# Patient Record
Sex: MALE | Race: BLACK OR AFRICAN AMERICAN | Employment: UNEMPLOYED | URBAN - METROPOLITAN AREA
[De-identification: names, ages, dates, MRNs, and addresses within clinical notes are randomized per-mention and may not be internally consistent; named-entity substitution may affect disease eponyms.]

---

## 2018-01-06 ENCOUNTER — HOSPITAL ENCOUNTER (EMERGENCY)
Age: 53
Discharge: HOME | End: 2018-01-06
Attending: EMERGENCY MEDICINE
Payer: SUBSIDIZED

## 2018-01-06 ENCOUNTER — APPOINTMENT (OUTPATIENT)
Dept: GENERAL RADIOLOGY | Age: 53
End: 2018-01-06
Attending: PHYSICIAN ASSISTANT
Payer: SUBSIDIZED

## 2018-01-06 VITALS
DIASTOLIC BLOOD PRESSURE: 85 MMHG | HEART RATE: 76 BPM | RESPIRATION RATE: 20 BRPM | OXYGEN SATURATION: 98 % | SYSTOLIC BLOOD PRESSURE: 122 MMHG | TEMPERATURE: 98.4 F | WEIGHT: 160 LBS

## 2018-01-06 DIAGNOSIS — E86.0 DEHYDRATION: ICD-10-CM

## 2018-01-06 DIAGNOSIS — S62.102A CLOSED FRACTURE OF LEFT WRIST, INITIAL ENCOUNTER: ICD-10-CM

## 2018-01-06 DIAGNOSIS — R73.9 HYPERGLYCEMIA: Primary | ICD-10-CM

## 2018-01-06 LAB
ANION GAP SERPL CALC-SCNC: 6 MMOL/L (ref 3–18)
APPEARANCE UR: CLEAR
BASOPHILS # BLD: 0.1 K/UL (ref 0–0.1)
BASOPHILS NFR BLD: 1 % (ref 0–2)
BILIRUB UR QL: NEGATIVE
BUN SERPL-MCNC: 21 MG/DL (ref 7–18)
BUN/CREAT SERPL: 23 (ref 12–20)
CALCIUM SERPL-MCNC: 8.7 MG/DL (ref 8.5–10.1)
CHLORIDE SERPL-SCNC: 98 MMOL/L (ref 100–108)
CO2 SERPL-SCNC: 28 MMOL/L (ref 21–32)
COLOR UR: YELLOW
CREAT SERPL-MCNC: 0.92 MG/DL (ref 0.6–1.3)
DIFFERENTIAL METHOD BLD: ABNORMAL
EOSINOPHIL # BLD: 0.4 K/UL (ref 0–0.4)
EOSINOPHIL NFR BLD: 8 % (ref 0–5)
ERYTHROCYTE [DISTWIDTH] IN BLOOD BY AUTOMATED COUNT: 12.9 % (ref 11.6–14.5)
GLUCOSE BLD STRIP.AUTO-MCNC: 100 MG/DL (ref 70–110)
GLUCOSE BLD STRIP.AUTO-MCNC: 504 MG/DL (ref 70–110)
GLUCOSE SERPL-MCNC: 453 MG/DL (ref 74–99)
GLUCOSE UR STRIP.AUTO-MCNC: >1000 MG/DL
HCT VFR BLD AUTO: 42.3 % (ref 36–48)
HGB BLD-MCNC: 14.2 G/DL (ref 13–16)
HGB UR QL STRIP: NEGATIVE
KETONES UR QL STRIP.AUTO: NEGATIVE MG/DL
LEUKOCYTE ESTERASE UR QL STRIP.AUTO: NEGATIVE
LYMPHOCYTES # BLD: 2.1 K/UL (ref 0.9–3.6)
LYMPHOCYTES NFR BLD: 39 % (ref 21–52)
MCH RBC QN AUTO: 28.4 PG (ref 24–34)
MCHC RBC AUTO-ENTMCNC: 33.6 G/DL (ref 31–37)
MCV RBC AUTO: 84.6 FL (ref 74–97)
MONOCYTES # BLD: 0.5 K/UL (ref 0.05–1.2)
MONOCYTES NFR BLD: 10 % (ref 3–10)
NEUTS SEG # BLD: 2.2 K/UL (ref 1.8–8)
NEUTS SEG NFR BLD: 42 % (ref 40–73)
NITRITE UR QL STRIP.AUTO: NEGATIVE
PH UR STRIP: 5.5 [PH] (ref 5–8)
PLATELET # BLD AUTO: 355 K/UL (ref 135–420)
PMV BLD AUTO: 10.1 FL (ref 9.2–11.8)
POTASSIUM SERPL-SCNC: 4.4 MMOL/L (ref 3.5–5.5)
PROT UR STRIP-MCNC: NEGATIVE MG/DL
RBC # BLD AUTO: 5 M/UL (ref 4.7–5.5)
SODIUM SERPL-SCNC: 132 MMOL/L (ref 136–145)
SP GR UR REFRACTOMETRY: >1.03 (ref 1–1.03)
UROBILINOGEN UR QL STRIP.AUTO: 0.2 EU/DL (ref 0.2–1)
WBC # BLD AUTO: 5.2 K/UL (ref 4.6–13.2)

## 2018-01-06 PROCEDURE — 74011250636 HC RX REV CODE- 250/636: Performed by: PHYSICIAN ASSISTANT

## 2018-01-06 PROCEDURE — 96375 TX/PRO/DX INJ NEW DRUG ADDON: CPT

## 2018-01-06 PROCEDURE — 74011250637 HC RX REV CODE- 250/637: Performed by: EMERGENCY MEDICINE

## 2018-01-06 PROCEDURE — 85025 COMPLETE CBC W/AUTO DIFF WBC: CPT

## 2018-01-06 PROCEDURE — 81003 URINALYSIS AUTO W/O SCOPE: CPT

## 2018-01-06 PROCEDURE — 80048 BASIC METABOLIC PNL TOTAL CA: CPT

## 2018-01-06 PROCEDURE — 73110 X-RAY EXAM OF WRIST: CPT

## 2018-01-06 PROCEDURE — 74011636637 HC RX REV CODE- 636/637: Performed by: PHYSICIAN ASSISTANT

## 2018-01-06 PROCEDURE — 96374 THER/PROPH/DIAG INJ IV PUSH: CPT

## 2018-01-06 PROCEDURE — 96361 HYDRATE IV INFUSION ADD-ON: CPT

## 2018-01-06 PROCEDURE — 75810000053 HC SPLINT APPLICATION

## 2018-01-06 PROCEDURE — 99284 EMERGENCY DEPT VISIT MOD MDM: CPT

## 2018-01-06 PROCEDURE — 82962 GLUCOSE BLOOD TEST: CPT

## 2018-01-06 PROCEDURE — 74011250637 HC RX REV CODE- 250/637: Performed by: PHYSICIAN ASSISTANT

## 2018-01-06 RX ORDER — DIPHENHYDRAMINE HCL 25 MG
25 CAPSULE ORAL
Status: COMPLETED | OUTPATIENT
Start: 2018-01-06 | End: 2018-01-06

## 2018-01-06 RX ORDER — PSEUDOEPHEDRINE HCL 30 MG
60 TABLET ORAL
Status: DISCONTINUED | OUTPATIENT
Start: 2018-01-06 | End: 2018-01-06

## 2018-01-06 RX ORDER — INSULIN GLARGINE 100 [IU]/ML
40 INJECTION, SOLUTION SUBCUTANEOUS DAILY
Qty: 8 PEN | Refills: 0 | Status: SHIPPED | OUTPATIENT
Start: 2018-01-06 | End: 2018-01-16

## 2018-01-06 RX ORDER — ONDANSETRON 2 MG/ML
4 INJECTION INTRAMUSCULAR; INTRAVENOUS
Status: COMPLETED | OUTPATIENT
Start: 2018-01-06 | End: 2018-01-06

## 2018-01-06 RX ORDER — KETOROLAC TROMETHAMINE 30 MG/ML
30 INJECTION, SOLUTION INTRAMUSCULAR; INTRAVENOUS
Status: COMPLETED | OUTPATIENT
Start: 2018-01-06 | End: 2018-01-06

## 2018-01-06 RX ORDER — LIDOCAINE 50 MG/G
1 PATCH TOPICAL EVERY 24 HOURS
Status: DISCONTINUED | OUTPATIENT
Start: 2018-01-06 | End: 2018-01-06 | Stop reason: HOSPADM

## 2018-01-06 RX ADMIN — DIPHENHYDRAMINE HYDROCHLORIDE 25 MG: 25 CAPSULE ORAL at 18:02

## 2018-01-06 RX ADMIN — KETOROLAC TROMETHAMINE 30 MG: 30 INJECTION, SOLUTION INTRAMUSCULAR at 16:19

## 2018-01-06 RX ADMIN — SODIUM CHLORIDE 1000 ML: 900 INJECTION, SOLUTION INTRAVENOUS at 16:14

## 2018-01-06 RX ADMIN — ONDANSETRON 4 MG: 2 INJECTION INTRAMUSCULAR; INTRAVENOUS at 16:13

## 2018-01-06 RX ADMIN — INSULIN HUMAN 10 UNITS: 100 INJECTION, SOLUTION PARENTERAL at 16:13

## 2018-01-06 NOTE — ED TRIAGE NOTES
Patient complains of pain in his left wrist after falling 2 days ago on to the ice [place].  Patient also complains that his sugar is high, he states that he took his last bit of insulin Lantus today

## 2018-01-06 NOTE — ED PROVIDER NOTES
HPI Comments: Jeanna Riley is a 46 y.o. Male with a history of HTN and DM who presents today for medication refill and left wrist pain. He states he takes 40 units of lantus twice a day but ran out last night. Does not check BG regularly and states BG is normally between 200-300. He reports his wrist pain is a 5/10, he fell on the ice, mechanical fall, did not hit his head and denies LOC. He has tried nothing for this pain, nothing makes it better and movement makes it worse. Pt denies any fevers or chills, headache, dizziness or light headedness, ENT issues, CP or discomfort, SOB, cough, n/v/d/c, abd pain, back pain, diaphoresis, melena/hematochezia, dysuria, hematuria, frequency, focal weakness/numbness/tingling, or rash. Patient has no other complaints at this time. Patient is a 46 y.o. male presenting with hyperglycemia. The history is provided by the patient. High Blood Sugar   Pertinent negatives include no chest pain, no abdominal pain, no headaches and no shortness of breath. Past Medical History:   Diagnosis Date    Diabetes ([facility] [address]     Hypertension        History reviewed. No pertinent surgical history. History reviewed. No pertinent family history. Social History     Social History    Marital status: SINGLE     Spouse name: N/A    Number of children: N/A    Years of education: N/A     Occupational History    Not on file. Social History Main Topics    Smoking status: Former Smoker    Smokeless tobacco: Not on file    Alcohol use Yes    Drug use: Not on file    Sexual activity: Not on file     Other Topics Concern    Not on file     Social History Narrative    No narrative on file         ALLERGIES: Review of patient's allergies indicates no known allergies. Review of Systems   Constitutional: Negative for chills and fever. HENT: Negative for congestion, rhinorrhea and sore throat. Eyes: Negative. Respiratory: Negative for cough and shortness of breath. Cardiovascular: Negative for chest pain. Gastrointestinal: Positive for nausea. Negative for abdominal pain, blood in stool, constipation, diarrhea and vomiting. Endocrine: Positive for polydipsia. Genitourinary: Negative for dysuria, frequency and hematuria. Musculoskeletal: Negative for back pain and myalgias. Left wrist pain    Skin: Negative for rash and wound. Allergic/Immunologic: Negative. Neurological: Negative for dizziness and headaches. Psychiatric/Behavioral: Negative. Vitals:    01/06/18 1515   BP: 122/85   Pulse: 76   Resp: 20   Temp: 98.4 °F (36.9 °C)   SpO2: 98%   Weight: 72.6 kg (160 lb)            Physical Exam   Constitutional: He is oriented to person, place, and time. He appears well-developed and well-nourished. No distress. HENT:   Head: Normocephalic and atraumatic. Eyes: Conjunctivae are normal.   Neck: Normal range of motion. Neck supple. Cardiovascular: Normal rate, regular rhythm and normal heart sounds. Pulmonary/Chest: Effort normal and breath sounds normal. No respiratory distress. He exhibits no tenderness. Abdominal: Soft. Bowel sounds are normal. He exhibits no distension. There is no tenderness. There is no rebound and no guarding. Musculoskeletal: Normal range of motion. He exhibits no edema or deformity. Left wrist: He exhibits tenderness. He exhibits normal range of motion, no bony tenderness, no swelling, no crepitus, no deformity and no laceration. Left wrist pain, no identifiable specific  point, tenderness to palpation over entire wrist both volar and dorsal surface. He is neurovascularly intact and cap refill is <3 sec. Full ROM No obvious deformity or dislocation    Neurological: He is alert and oriented to person, place, and time. Skin: Skin is warm and dry. He is not diaphoretic. Psychiatric: He has a normal mood and affect. His behavior is normal.   Nursing note and vitals reviewed.        MDM  Number of Diagnoses or Management Options  Closed fracture of left wrist, initial encounter:   Dehydration:   Hyperglycemia:   Diagnosis management comments: Differential: Fracture, laceration, contusion, scaphoid fracture, DKA, hyperglycemia     Plan: Will order a fluid bolus, insulin bolus, and left wrist xray. 5:41 PM  There is no evidence of DKA or urinary tract infection. Patient BG is down at 100, will give patient a snack before leaving and emergency snack to go home with. Patient states understanding and awareness of what low blood sugar feels like and understanding of how to correct that. I will discharge home with refill on lantus to get him to his Wednesday appointment with PCP. Patient is feeling much better after fluids. Waiting for wet read on xray. 6:01 PM : Pt care transferred to [facility] ,ED provider. History of patient complaint(s), available diagnostic reports and current treatment plan has been discussed thoroughly. Bedside rounding on patient occured : No  Intended disposition of patient : Home  Pending diagnostics reports and/or labs (please list): Pending xray of left wrist results     6:35 PM  X-ray shows nothing acute. However, pt only has snuff box tenderness; apply splint; [facility] where pt prefers to move. Pt informed his actual fracture may not be visible on x-ray for a week and needs f/up. Splint applied by nurse to left wrist; excellent position. N/v intact before and after application.            Amount and/or Complexity of Data Reviewed  Clinical lab tests: ordered and reviewed  Tests in the radiology section of CPT®: ordered and reviewed    Risk of Complications, Morbidity, and/or Mortality  Presenting problems: low  Diagnostic procedures: low  Management options: low      ED Course       Procedures    Recent Results (from the past 12 hour(s))   GLUCOSE, POC    Collection Time: 01/06/18  3:14 PM   Result Value Ref Range    Glucose (POC) 504 (HH) 70 - 110 mg/dL CBC WITH AUTOMATED DIFF    Collection Time: 01/06/18  4:05 PM   Result Value Ref Range    WBC 5.2 4.6 - 13.2 K/uL    RBC 5.00 4.70 - 5.50 M/uL    HGB 14.2 13.0 - 16.0 g/dL    HCT 42.3 36.0 - 48.0 %    MCV 84.6 74.0 - 97.0 FL    MCH 28.4 24.0 - 34.0 PG    MCHC 33.6 31.0 - 37.0 g/dL    RDW 12.9 11.6 - 14.5 %    PLATELET 975 563 - 430 K/uL    MPV 10.1 9.2 - 11.8 FL    NEUTROPHILS 42 40 - 73 %    LYMPHOCYTES 39 21 - 52 %    MONOCYTES 10 3 - 10 %    EOSINOPHILS 8 (H) 0 - 5 %    BASOPHILS 1 0 - 2 %    ABS. NEUTROPHILS 2.2 1.8 - 8.0 K/UL    ABS. LYMPHOCYTES 2.1 0.9 - 3.6 K/UL    ABS. MONOCYTES 0.5 0.05 - 1.2 K/UL    ABS. EOSINOPHILS 0.4 0.0 - 0.4 K/UL    ABS. BASOPHILS 0.1 0.0 - 0.1 K/UL    DF AUTOMATED     METABOLIC PANEL, BASIC    Collection Time: 01/06/18  4:05 PM   Result Value Ref Range    Sodium 132 (L) 136 - 145 mmol/L    Potassium 4.4 3.5 - 5.5 mmol/L    Chloride 98 (L) 100 - 108 mmol/L    CO2 28 21 - 32 mmol/L    Anion gap 6 3.0 - 18 mmol/L    Glucose 453 (HH) 74 - 99 mg/dL    BUN 21 (H) 7.0 - 18 MG/DL    Creatinine 0.92 0.6 - 1.3 MG/DL    BUN/Creatinine ratio 23 (H) 12 - 20      GFR est AA >60 >60 ml/min/1.73m2    GFR est non-AA >60 >60 ml/min/1.73m2    Calcium 8.7 8.5 - 10.1 MG/DL   URINALYSIS W/ RFLX MICROSCOPIC    Collection Time: 01/06/18  4:05 PM   Result Value Ref Range    Color YELLOW      Appearance CLEAR      Specific gravity >1.030 (H) 1.005 - 1.030    pH (UA) 5.5 5.0 - 8.0      Protein NEGATIVE  NEG mg/dL    Glucose >1000 (A) NEG mg/dL    Ketone NEGATIVE  NEG mg/dL    Bilirubin NEGATIVE  NEG      Blood NEGATIVE  NEG      Urobilinogen 0.2 0.2 - 1.0 EU/dL    Nitrites NEGATIVE  NEG      Leukocyte Esterase NEGATIVE  NEG     GLUCOSE, POC    Collection Time: 01/06/18  5:31 PM   Result Value Ref Range    Glucose (POC) 100 70 - 110 mg/dL     6:36 PM  Diagnosis:   1. Hyperglycemia    2. Dehydration    3.  Closed fracture of left wrist, initial encounter          Disposition: home    Follow-up Information Follow up With Details Comments [address]  [place] [address]  [address] Schedule an appointment as soon as possible for a visit in 3 days  [address] [place] [facility] EMERGENCY DEPT  If symptoms worsen return immediately [address] [facility]    Asif Dudley MD Schedule an appointment as soon as possible for a visit in 3 days  [address] [address]  [phone]            Patient's Medications   Start Taking    INSULIN GLARGINE (LANTUS,BASAGLAR) 100 UNIT/ML (3 ML) INPN    40 Units by SubCUTAneous route daily for 10 days. INSULIN SYRINGE-NEEDLE U-100    40 Units by SubCUTAneous route two (2) times a day.  In the AM and PM   Continue Taking    No medications on file   These Medications have changed    No medications on file   Stop Taking    No medications on file

## 2018-01-07 NOTE — DISCHARGE INSTRUCTIONS
Learning About High Blood Sugar  What is high blood sugar? Your body turns the food you eat into glucose (sugar), which it uses for energy. But if your body isn't able to use the sugar right away, it can build up in your blood and lead to high blood sugar. When the amount of sugar in your blood stays too high for too much of the time, you may have diabetes. Diabetes is a disease that can cause serious health problems. The good news is that lifestyle changes may help you get your blood sugar back to normal and avoid or delay diabetes. What causes high blood sugar? Sugar (glucose) can build up in your blood if you:  · Are overweight. · Have a family history of diabetes. · Take certain medicines, such as steroids. What are the symptoms? Having high blood sugar may not cause any symptoms at all. Or it may make you feel very thirsty or very hungry. You may also urinate more often than usual, have blurry vision, or lose weight without trying. How is high blood sugar treated? You can take steps to lower your blood sugar level if you understand what makes it get higher. Your doctor may want you to learn how to test your blood sugar level at home. Then you can see how illness, stress, or different kinds of food or medicine raise or lower your blood sugar level. Other tests may be needed to see if you have diabetes. How can you prevent high blood sugar? · Watch your weight. If you're overweight, losing just a small amount of weight may help. Reducing fat around your waist is most important. · Limit the amount of calories, sweets, and unhealthy fat you eat. Ask your doctor if a dietitian can help you. A registered dietitian can help you create meal plans that fit your lifestyle. · Get at least 30 minutes of exercise on most days of the week. Exercise helps control your blood sugar. It also helps you maintain a healthy weight. Walking is a good choice.  You also may want to do other activities, such as running, swimming, cycling, or playing tennis or team sports. · If your doctor prescribed medicines, take them exactly as prescribed. Call your doctor if you think you are having a problem with your medicine. You will get more details on the specific medicines your doctor prescribes. Follow-up care is a key part of your treatment and safety. Be sure to make and go to all appointments, and call your doctor if you are having problems. It's also a good idea to know your test results and keep a list of the medicines you take. Where can you learn more? Go to http://paco-chavo.info/. Enter O108 in the search box to learn more about \"Learning About High Blood Sugar. \"  Current as of: March 13, 2017  Content Version: 11.4  © 7349-6739 [employer]. Care instructions adapted under license by [employer] (which disclaims liability or warranty for this information). If you have questions about a medical condition or this instruction, always ask your healthcare professional. [address] any warranty or liability for your use of this information. Learning About High Blood Sugar  What is high blood sugar? Your body turns the food you eat into glucose (sugar), which it uses for energy. But if your body isn't able to use the sugar right away, it can build up in your blood and lead to high blood sugar. When the amount of sugar in your blood stays too high for too much of the time, you may have diabetes. Diabetes is a disease that can cause serious health problems. The good news is that lifestyle changes may help you get your blood sugar back to normal and avoid or delay diabetes. What causes high blood sugar? Sugar (glucose) can build up in your blood if you:  · Are overweight. · Have a family history of diabetes. · Take certain medicines, such as steroids. What are the symptoms? Having high blood sugar may not cause any symptoms at all.  Or it may make you feel very thirsty or very hungry. You may also urinate more often than usual, have blurry vision, or lose weight without trying. How is high blood sugar treated? You can take steps to lower your blood sugar level if you understand what makes it get higher. Your doctor may want you to learn how to test your blood sugar level at home. Then you can see how illness, stress, or different kinds of food or medicine raise or lower your blood sugar level. Other tests may be needed to see if you have diabetes. How can you prevent high blood sugar? · Watch your weight. If you're overweight, losing just a small amount of weight may help. Reducing fat around your waist is most important. · Limit the amount of calories, sweets, and unhealthy fat you eat. Ask your doctor if a dietitian can help you. A registered dietitian can help you create meal plans that fit your lifestyle. · Get at least 30 minutes of exercise on most days of the week. Exercise helps control your blood sugar. It also helps you maintain a healthy weight. Walking is a good choice. You also may want to do other activities, such as running, swimming, cycling, or playing tennis or team sports. · If your doctor prescribed medicines, take them exactly as prescribed. Call your doctor if you think you are having a problem with your medicine. You will get more details on the specific medicines your doctor prescribes. Follow-up care is a key part of your treatment and safety. Be sure to make and go to all appointments, and call your doctor if you are having problems. It's also a good idea to know your test results and keep a list of the medicines you take. Where can you learn more? Go to http://paco-chavo.info/. Enter O108 in the search box to learn more about \"Learning About High Blood Sugar. \"  Current as of: March 13, 2017  Content Version: 11.4  © 0787-1426 [employer].  Care instructions adapted under license by [employer] (which disclaims liability or warranty for this information). If you have questions about a medical condition or this instruction, always ask your healthcare professional. [address] any warranty or liability for your use of this information.

## 2018-04-18 ENCOUNTER — HOSPITAL ENCOUNTER (EMERGENCY)
Age: 53
Discharge: HOME | End: 2018-04-18
Attending: EMERGENCY MEDICINE
Payer: SUBSIDIZED

## 2018-04-18 VITALS
OXYGEN SATURATION: 96 % | SYSTOLIC BLOOD PRESSURE: 159 MMHG | RESPIRATION RATE: 16 BRPM | HEART RATE: 73 BPM | DIASTOLIC BLOOD PRESSURE: 90 MMHG | TEMPERATURE: 97.1 F | WEIGHT: 79.3 LBS | BODY MASS INDEX: 12.45 KG/M2 | HEIGHT: 67 IN

## 2018-04-18 VITALS
OXYGEN SATURATION: 97 % | BODY MASS INDEX: 27.47 KG/M2 | HEIGHT: 67 IN | SYSTOLIC BLOOD PRESSURE: 152 MMHG | RESPIRATION RATE: 16 BRPM | TEMPERATURE: 97.1 F | HEART RATE: 70 BPM | DIASTOLIC BLOOD PRESSURE: 101 MMHG | WEIGHT: 175 LBS

## 2018-04-18 DIAGNOSIS — M25.562 CHRONIC PAIN OF BOTH KNEES: ICD-10-CM

## 2018-04-18 DIAGNOSIS — M25.532 CHRONIC PAIN OF LEFT WRIST: ICD-10-CM

## 2018-04-18 DIAGNOSIS — G89.29 CHRONIC PAIN OF BOTH KNEES: ICD-10-CM

## 2018-04-18 DIAGNOSIS — R73.9 HYPERGLYCEMIA: ICD-10-CM

## 2018-04-18 DIAGNOSIS — R21 RASH: Primary | ICD-10-CM

## 2018-04-18 DIAGNOSIS — M25.561 CHRONIC PAIN OF BOTH KNEES: ICD-10-CM

## 2018-04-18 DIAGNOSIS — Z76.0 MEDICATION REFILL: Primary | ICD-10-CM

## 2018-04-18 DIAGNOSIS — G89.29 CHRONIC PAIN OF LEFT WRIST: ICD-10-CM

## 2018-04-18 DIAGNOSIS — I10 ESSENTIAL HYPERTENSION: ICD-10-CM

## 2018-04-18 LAB — GLUCOSE BLD STRIP.AUTO-MCNC: 211 MG/DL (ref 70–110)

## 2018-04-18 PROCEDURE — 74011250637 HC RX REV CODE- 250/637: Performed by: PHYSICIAN ASSISTANT

## 2018-04-18 PROCEDURE — 99283 EMERGENCY DEPT VISIT LOW MDM: CPT

## 2018-04-18 PROCEDURE — 82962 GLUCOSE BLOOD TEST: CPT

## 2018-04-18 PROCEDURE — L3908 WHO COCK-UP NONMOLDE PRE OTS: HCPCS

## 2018-04-18 RX ORDER — LISINOPRIL 10 MG/1
10 TABLET ORAL
Status: COMPLETED | OUTPATIENT
Start: 2018-04-18 | End: 2018-04-18

## 2018-04-18 RX ORDER — NAPROXEN 250 MG/1
500 TABLET ORAL
Status: COMPLETED | OUTPATIENT
Start: 2018-04-18 | End: 2018-04-18

## 2018-04-18 RX ORDER — HYDROCORTISONE 0.5 %
OINTMENT (GRAM) TOPICAL 2 TIMES DAILY
Qty: 15 G | Refills: 0 | Status: SHIPPED | OUTPATIENT
Start: 2018-04-18 | End: 2018-04-28

## 2018-04-18 RX ORDER — LISINOPRIL 10 MG/1
10 TABLET ORAL DAILY
Qty: 30 TAB | Refills: 0 | Status: SHIPPED | OUTPATIENT
Start: 2018-04-18 | End: 2018-05-01 | Stop reason: DRUGHIGH

## 2018-04-18 RX ORDER — METFORMIN HYDROCHLORIDE 500 MG/1
500 TABLET ORAL
Status: COMPLETED | OUTPATIENT
Start: 2018-04-18 | End: 2018-04-18

## 2018-04-18 RX ORDER — DIPHENHYDRAMINE HCL 25 MG
25 CAPSULE ORAL
Status: COMPLETED | OUTPATIENT
Start: 2018-04-18 | End: 2018-04-18

## 2018-04-18 RX ORDER — METFORMIN HYDROCHLORIDE 500 MG/1
500 TABLET ORAL 2 TIMES DAILY WITH MEALS
Qty: 30 TAB | Refills: 0 | Status: SHIPPED | OUTPATIENT
Start: 2018-04-18 | End: 2018-05-01

## 2018-04-18 RX ORDER — DIPHENHYDRAMINE HCL 25 MG
CAPSULE ORAL
Qty: 16 CAP | Refills: 0 | Status: SHIPPED | OUTPATIENT
Start: 2018-04-18 | End: 2018-05-02

## 2018-04-18 RX ADMIN — DIPHENHYDRAMINE HYDROCHLORIDE 25 MG: 25 CAPSULE ORAL at 13:32

## 2018-04-18 RX ADMIN — METFORMIN HYDROCHLORIDE 500 MG: 500 TABLET, FILM COATED ORAL at 09:20

## 2018-04-18 RX ADMIN — Medication 500 MG: at 09:07

## 2018-04-18 RX ADMIN — LISINOPRIL 10 MG: 10 TABLET ORAL at 09:07

## 2018-04-18 NOTE — ED NOTES
The L.C. met with the client in the ER Department room 18 to discuss their follow up options. The client received the application for the [facility]. The client will receive assistance for their medications through the [employer]. The client was unable to afford their medications in the pass, has no insurance and resides at the [facility]. The plan of action is to work with the [employer] Department in which they will connect with a primary care provider at the [facility] where they will also receive assistance with their future medications.

## 2018-04-18 NOTE — DISCHARGE INSTRUCTIONS
Rash: Care Instructions  Your Care Instructions  A rash is any irritation or inflammation of the skin. Rashes have many possible causes, including allergy, infection, illness, heat, and emotional stress. Follow-up care is a key part of your treatment and safety. Be sure to make and go to all appointments, and call your doctor if you are having problems. It's also a good idea to know your test results and keep a list of the medicines you take. How can you care for yourself at home? · Wash the area with water only. Soap can make dryness and itching worse. Pat dry. · Put cold, wet cloths on the rash to reduce itching. · Keep cool, and stay out of the sun. · Leave the rash open to the air as much of the time as possible. · Sometimes petroleum jelly (Vaseline) can help relieve the discomfort caused by a rash. A moisturizing lotion, such as Cetaphil, also may help. Calamine lotion may help for rashes caused by contact with something (such as a plant or soap) that irritated the skin. Use it 3 or 4 times a day. · If your doctor prescribed a cream, use it as directed. If your doctor prescribed medicine, take it exactly as directed. · If your rash itches so badly that it interferes with your normal activities, take an over-the-counter antihistamine, such as diphenhydramine (Benadryl) or loratadine (Claritin). Read and follow all instructions on the label. When should you call for help? Call your doctor now or seek immediate medical care if:  ? · You have signs of infection, such as:  ¨ Increased pain, swelling, warmth, or redness. ¨ Red streaks leading from the area. ¨ Pus draining from the area. ¨ A fever. ? · You have joint pain along with the rash. ? Watch closely for changes in your health, and be sure to contact your doctor if:  ? · Your rash is changing or getting worse. For example, call if you have pain along with the rash, the rash is spreading, or you have new blisters.    ? · You do not get better after 1 week. Where can you learn more? Go to http://paco-chavo.info/. Enter W867 in the search box to learn more about \"Rash: Care Instructions. \"  Current as of: 2016  Content Version: 11.4  © 9022-5916 [employer]. Care instructions adapted under license by [employer] (which disclaims liability or warranty for this information). If you have questions about a medical condition or this instruction, always ask your healthcare professional. [address] any warranty or liability for your use of this information. [employer] Activation    Thank you for requesting access to [employer]. Please follow the instructions below to securely access and download your online medical record. [employer] allows you to send messages to your doctor, view your test results, renew your prescriptions, schedule appointments, and more. How Do I Sign Up? 1. In your internet browser, go to [email or web address]  2. Click on the First Time User? Click Here link in the Sign In box. You will be redirect to the New Member Sign Up page. 3. Enter your [employer] Access Code exactly as it appears below. You will not need to use this code after youve completed the sign-up process. If you do not sign up before the expiration date, you must request a new code. [employer] Access Code: [email or web address]  Expires: 2018  8:26 AM (This is the date your [employer] access code will )    4. Enter the last four digits of your Social Security Number (xxxx) and Date of Birth (mm/dd/yyyy) as indicated and click Submit. You will be taken to the next sign-up page. 5. Create a [employer] ID. This will be your [employer] login ID and cannot be changed, so think of one that is secure and easy to remember. 6. Create a [employer] password. You can change your password at any time. 7. Enter your Password Reset Question and Answer.  This can be used at a later time if you forget your password. 8. Enter your e-mail address. You will receive e-mail notification when new information is available in [address] 9. Click Sign Up. You can now view and download portions of your medical record. 10. Click the Download Summary menu link to download a portable copy of your medical information. Additional Information    If you have questions, please visit the Frequently Asked Questions section of the [employer] website at [email or web address] Remember, [employer] is NOT to be used for urgent needs. For medical emergencies, dial 911.

## 2018-04-18 NOTE — ED PROVIDER NOTES
EMERGENCY DEPARTMENT HISTORY AND PHYSICAL EXAM    8:32 AM      Date: 4/18/2018  Patient Name: Florentino Alfaro    History of Presenting Illness     Chief Complaint   Patient presents with    Medication Refill    Knee Pain    Hand Pain         History Provided By: Patient    Chief Complaint: medication refill, b/l knee pain  Duration:  Weeks  Timing:  Acute  Location: b/l knee  Quality: Aching  Severity: Mild  Modifying Factors: worse with movement  Associated Symptoms: denies any other associated signs or symptoms      Additional History (Context): Florentino Alfaro is a 46 y.o. male with diabetes and hypertension who presents with c/o medication refill. Pt notes he ran out of his insulin, Metformin, and blood pressure medication x 1 month. Notes he had follow-up with his PMD 4/16, but they had to cancel the appointment. Also notes b/l knee pain, L wrist pain x \"awhile\". Notes hx of arthritis. No injury/trauma, numbness/tingling, discoloration. PCP: None    Current Facility-Administered Medications   Medication Dose Route Frequency Provider Last Rate Last Dose    metFORMIN (GLUCOPHAGE) tablet 500 mg  500 mg Oral NOW Nallely N Scissom        lisinopril (PRINIVIL, ZESTRIL) tablet 10 mg  10 mg Oral NOW Nallely N Scissom        naproxen (NAPROSYN) tablet 500 mg  500 mg Oral NOW Nallely N Scissmichelle         Current Outpatient Prescriptions   Medication Sig Dispense Refill    metFORMIN (GLUCOPHAGE) 500 mg tablet Take 1 Tab by mouth two (2) times daily (with meals). 30 Tab 0    lisinopril (PRINIVIL) 10 mg tablet Take 1 Tab by mouth daily for 30 days. 30 Tab 0    insulin syringe-needle U-100 40 Units by SubCUTAneous route two (2) times a day.  In the AM and  Syringe 11    glucose blood VI test strips (BLOOD GLUCOSE TEST) strip Check blood sugar three times a day prior to insulin administration 100 Strip 0       Past History     Past Medical History:  Past Medical History:   Diagnosis Date    Diabetes ([facility] [address]     Hypertension        Past Surgical History:  No past surgical history on file. Family History:  No family history on file. Social History:  Social History   Substance Use Topics    Smoking status: Former Smoker    Smokeless tobacco: Not on file    Alcohol use Yes       Allergies:  No Known Allergies      Review of Systems       Review of Systems   Constitutional: Negative for chills and fever. Respiratory: Negative for shortness of breath. Cardiovascular: Negative for chest pain. Gastrointestinal: Negative for abdominal pain, nausea and vomiting. Musculoskeletal: Positive for arthralgias (knee pain ). Skin: Negative for rash. Neurological: Negative for weakness. All other systems reviewed and are negative. Physical Exam     Visit Vitals    BP (!) 152/101 (BP 1 Location: Left arm, BP Patient Position: Sitting)    Pulse 70    Temp 97.1 °F (36.2 °C)    Resp 16    Ht 5' 7\" (1.702 m)    Wt 79.4 kg (175 lb)    SpO2 97%    BMI 27.41 kg/m2         Physical Exam   Constitutional: He appears well-developed and well-nourished. No distress. HENT:   Head: Normocephalic and atraumatic. Neck: Normal range of motion. Neck supple. Cardiovascular: Normal rate, regular rhythm, normal heart sounds and intact distal pulses. Exam reveals no gallop and no friction rub. No murmur heard. Pulmonary/Chest: Effort normal and breath sounds normal. No respiratory distress. He has no wheezes. He has no rales. Abdominal: Soft. Bowel sounds are normal. He exhibits no distension and no mass. There is no tenderness. There is no rebound and no guarding. Musculoskeletal: Normal range of motion. He exhibits no edema, tenderness or deformity. Right knee: Normal.        Left knee: Normal.   Neurological: He is alert. Skin: Skin is warm. No rash noted. He is not diaphoretic. Nursing note and vitals reviewed.         Diagnostic Study Results     Labs -  Recent Results (from the past 12 hour(s))   GLUCOSE, POC    Collection Time: 04/18/18  8:22 AM   Result Value Ref Range    Glucose (POC) 211 (H) 70 - 110 mg/dL       Radiologic Studies -   No orders to display         Medical Decision Making   I am the first provider for this patient. I reviewed the vital signs, available nursing notes, past medical history, past surgical history, family history and social history. Vital Signs-Reviewed the patient's vital signs. Pulse Oximetry Analysis -  97 on room air     Records Reviewed: Nursing Notes and Old Medical Records (Time of Review: 8:32 AM)    ED Course: Progress Notes, Reevaluation, and Consults:  8:32 AM: Ariella , in to see patient. 9:03 AM: Discussed with patient that we will refill prescriptions including lancets. Discussed importance of close follow-up and return precautions. Provider Notes (Medical Decision Making): 45 yo M who presents for medication refill for Lisinopril, Metformin, Insulin. Blood sugar 211. No abdominal pain, n/v/d, or other associated symptoms. Also notes chronic b/l knee pain x \"awhile\". No signs of injury/trauma, erythema or edema. Hx of arthritis.  saw patient in ED, helping with medication refills and follow-up. Stable for d/c with outpatient follow-up. Diagnosis     Clinical Impression:   1. Medication refill    2. Hyperglycemia    3. Essential hypertension    4.  Chronic pain of both knees        Disposition: home     Follow-up Information     Follow up With Details Comments Contact Info    [facility] EMERGENCY DEPT  If symptoms worsen [address] [place] [id number]  Keely Blanca Schedule an appointment as soon as possible for a visit  [address]  [place] Schedule an appointment as soon as possible for a visit  [address] [facility] Call  [address] [address] [phone]           Patient's Medications   Start Taking    GLUCOSE BLOOD VI TEST STRIPS (BLOOD GLUCOSE TEST) STRIP    Check blood sugar three times a day prior to insulin administration    LISINOPRIL (PRINIVIL) 10 MG TABLET    Take 1 Tab by mouth daily for 30 days. METFORMIN (GLUCOPHAGE) 500 MG TABLET    Take 1 Tab by mouth two (2) times daily (with meals). Continue Taking    No medications on file   These Medications have changed    Modified Medication Previous Medication    INSULIN SYRINGE-NEEDLE U-100 insulin syringe-needle U-100       40 Units by SubCUTAneous route two (2) times a day. In the AM and PM    40 Units by SubCUTAneous route two (2) times a day.  In the AM and PM   Stop Taking    No medications on file

## 2018-04-18 NOTE — ED PROVIDER NOTES
HPI Comments: Pt presents to the ED complaining of moderate acute bilateral rash on foot with associated symptoms of itching onset 2 weeks ago. Pt states that he has had a rash on both feet that has been itching on both left and right feet. Pt states that he does not take any daily meds. He deines fever or chills. Pt states that it has been itchg really bad. Pt denies any recent travel outside [address] [place] [address] [address] in past 30 days. No new complains or concerns voiced at this time. Patient is a 46 y.o. male presenting with rash. The history is provided by the patient. Rash    The current episode started more than 1 week ago. The problem has not changed since onset. Associated with: itching. There has been no fever. The rash is present on the left foot and right foot. The pain is moderate. Associated symptoms include itching. Past Medical History:   Diagnosis Date    Diabetes ([facility] [address]     Hypertension        No past surgical history on file. No family history on file. Social History     Social History    Marital status: SINGLE     Spouse name: N/A    Number of children: N/A    Years of education: N/A     Occupational History    Not on file. Social History Main Topics    Smoking status: Former Smoker    Smokeless tobacco: Not on file    Alcohol use Yes    Drug use: Not on file    Sexual activity: Not on file     Other Topics Concern    Not on file     Social History Narrative    No narrative on file         ALLERGIES: Review of patient's allergies indicates no known allergies. Review of Systems   Constitutional: Negative for chills and fever. Respiratory: Negative for shortness of breath. Cardiovascular: Negative for chest pain. Gastrointestinal: Negative for abdominal pain, nausea and vomiting. Skin: Positive for itching and rash (feet). Neurological: Negative for weakness. All other systems reviewed and are negative.       Vitals:    04/18/18 1255   BP: 159/90   Pulse: 73   Resp: 16 Temp: 97.1 °F (36.2 °C)   SpO2: 96%   Weight: 36 kg (79 lb 4.8 oz)   Height: 5' 7\" (1.702 m)            Physical Exam   Constitutional: He appears well-developed and well-nourished. No distress. HENT:   Head: Normocephalic and atraumatic. Neck: Normal range of motion. Neck supple. Cardiovascular: Normal rate, regular rhythm and normal heart sounds. Exam reveals no gallop and no friction rub. No murmur heard. DP 2+   Pulmonary/Chest: Effort normal and breath sounds normal. No respiratory distress. He has no wheezes. He has no rales. Musculoskeletal:   No interdigital rash or erythema,  Scaling skin medial aspect of R ankle/foot, no streaking, no erythema or warmth   Neurological: He is alert. Skin: Skin is warm. No rash noted. He is not diaphoretic. Nursing note and vitals reviewed. Medications ordered:   Medications   diphenhydrAMINE (BENADRYL) capsule 25 mg (25 mg Oral Given 4/18/18 1332)         Lab findings:  Recent Results (from the past 12 hour(s))   GLUCOSE, POC    Collection Time: 04/18/18  8:22 AM   Result Value Ref Range    Glucose (POC) 211 (H) 70 - 110 mg/dL         Disposition:  Diagnosis:   1. Rash        Disposition: home     Follow-up Information     Follow up With Details Comments Contact Info    [facility] EMERGENCY DEPT  If symptoms worsen [address] [facility] [address]  [phone]           Patient's Medications   Start Taking    DIPHENHYDRAMINE (BENADRYL) 25 MG CAPSULE    Take 1-2 tabs every 6 hours as needed. HYDROCORTISONE (CORTIZONE) 0.5 % OINTMENT    Apply  to affected area two (2) times a day for 10 days. Apply to affected area   Continue Taking    GLUCOSE BLOOD VI TEST STRIPS (BLOOD GLUCOSE TEST) STRIP    Check blood sugar three times a day prior to insulin administration    INSULIN SYRINGE-NEEDLE U-100    40 Units by SubCUTAneous route two (2) times a day. In the AM and PM    LISINOPRIL (PRINIVIL) 10 MG TABLET    Take 1 Tab by mouth daily for 30 days. METFORMIN (GLUCOPHAGE) 500 MG TABLET    Take 1 Tab by mouth two (2) times daily (with meals). These Medications have changed    No medications on file   Stop Taking    No medications on file       MDM : Pt with scaling skin and excoriations medial aspect of ankle. No interdigital rash. No erythema or warmth. Extremity neurovascularly intact. Will prescribe Hydrocortisone and Benadryl for itching. Was evaluated earlier today and provided outpatient follow-up for DM/HTN medication and managament. Procedures      Scribe Attestation      Teodoro Bautista acting as a scribe for and in the presence of Savita Cervantes PA-C      April 18, 2018 at 1:16 PM       Provider Attestation:      I personally performed the services described in the documentation, reviewed the documentation, as recorded by the scribe in my presence, and it accurately and completely records my words and actions.  April 18, 2018 at 1:16 PM - Savita Cervantes PA-C

## 2018-04-18 NOTE — Clinical Note
Take medication as prescribed. Follow-up with your primary care physician in 2 days for reassessment. Bring the results from this visit with your for their review. Return to the ED for any new, worsening, or persistent symptoms

## 2018-04-18 NOTE — DISCHARGE INSTRUCTIONS
Knee Pain or Injury: Care Instructions  Your Care Instructions    Injuries are a common cause of knee problems. Sudden (acute) injuries may be caused by a direct blow to the knee. They can also be caused by abnormal twisting, bending, or falling on the knee. Pain, bruising, or swelling may be severe, and may start within minutes of the injury. Overuse is another cause of knee pain. Other causes are climbing stairs, kneeling, and other activities that use the knee. Everyday wear and tear, especially as you get older, also can cause knee pain. Rest, along with home treatment, often relieves pain and allows your knee to heal. If you have a serious knee injury, you may need tests and treatment. Follow-up care is a key part of your treatment and safety. Be sure to make and go to all appointments, and call your doctor if you are having problems. It's also a good idea to know your test results and keep a list of the medicines you take. How can you care for yourself at home? · Be safe with medicines. Read and follow all instructions on the label. ¨ If the doctor gave you a prescription medicine for pain, take it as prescribed. ¨ If you are not taking a prescription pain medicine, ask your doctor if you can take an over-the-counter medicine. · Rest and protect your knee. Take a break from any activity that may cause pain. · Put ice or a cold pack on your knee for 10 to 20 minutes at a time. Put a thin cloth between the ice and your skin. · Prop up a sore knee on a pillow when you ice it or anytime you sit or lie down for the next 3 days. Try to keep it above the level of your heart. This will help reduce swelling. · If your knee is not swollen, you can put moist heat, a heating pad, or a warm cloth on your knee. · If your doctor recommends an elastic bandage, sleeve, or other type of support for your knee, wear it as directed.   · Follow your doctor's instructions about how much weight you can put on your leg. Use a cane, crutches, or a walker as instructed. · Follow your doctor's instructions about activity during your healing process. If you can do mild exercise, slowly increase your activity. · Reach and stay at a healthy weight. Extra weight can strain the joints, especially the knees and hips, and make the pain worse. Losing even a few pounds may help. When should you call for help? Call 911 anytime you think you may need emergency care. For example, call if:  ? · You have symptoms of a blood clot in your lung (called a pulmonary embolism). These may include:  ¨ Sudden chest pain. ¨ Trouble breathing. ¨ Coughing up blood. ?Call your doctor now or seek immediate medical care if:  ? · You have severe or increasing pain. ? · Your leg or foot turns cold or changes color. ? · You cannot stand or put weight on your knee. ? · Your knee looks twisted or bent out of shape. ? · You cannot move your knee. ? · You have signs of infection, such as:  ¨ Increased pain, swelling, warmth, or redness. ¨ Red streaks leading from the knee. ¨ Pus draining from a place on your knee. ¨ A fever. ? · You have signs of a blood clot in your leg (called a deep vein thrombosis), such as:  ¨ Pain in your calf, back of the knee, thigh, or groin. ¨ Redness and swelling in your leg or groin. ? Watch closely for changes in your health, and be sure to contact your doctor if:  ? · You have tingling, weakness, or numbness in your knee. ? · You have any new symptoms, such as swelling. ? · You have bruises from a knee injury that last longer than 2 weeks. ? · You do not get better as expected. Where can you learn more? Go to http://paco-chavo.info/. Enter K195 in the search box to learn more about \"Knee Pain or Injury: Care Instructions. \"  Current as of: March 20, 2017  Content Version: 11.4  © 3405-4800 [employer].  Care instructions adapted under license by [employer] (which disclaims liability or warranty for this information). If you have questions about a medical condition or this instruction, always ask your healthcare professional. [address] any warranty or liability for your use of this information. High Blood Pressure: Care Instructions  Your Care Instructions    If your blood pressure is usually above 140/90, you have high blood pressure, or hypertension. That means the top number is 140 or higher or the bottom number is 90 or higher, or both. Despite what a lot of people think, high blood pressure usually doesn't cause headaches or make you feel dizzy or lightheaded. It usually has no symptoms. But it does increase your risk for heart attack, stroke, and kidney or eye damage. The higher your blood pressure, the more your risk increases. Your doctor will give you a goal for your blood pressure. Your goal will be based on your health and your age. An example of a goal is to keep your blood pressure below 140/90. Lifestyle changes, such as eating healthy and being active, are always important to help lower blood pressure. You might also take medicine to reach your blood pressure goal.  Follow-up care is a key part of your treatment and safety. Be sure to make and go to all appointments, and call your doctor if you are having problems. It's also a good idea to know your test results and keep a list of the medicines you take. How can you care for yourself at home? Medical treatment  · If you stop taking your medicine, your blood pressure will go back up. You may take one or more types of medicine to lower your blood pressure. Be safe with medicines. Take your medicine exactly as prescribed. Call your doctor if you think you are having a problem with your medicine. · Talk to your doctor before you start taking aspirin every day. Aspirin can help certain people lower their risk of a heart attack or stroke.  But taking aspirin isn't right for everyone, because it can cause serious bleeding. · See your doctor regularly. You may need to see the doctor more often at first or until your blood pressure comes down. · If you are taking blood pressure medicine, talk to your doctor before you take decongestants or anti-inflammatory medicine, such as ibuprofen. Some of these medicines can raise blood pressure. · Learn how to check your blood pressure at home. Lifestyle changes  · Stay at a healthy weight. This is especially important if you put on weight around the waist. Losing even 10 pounds can help you lower your blood pressure. · If your doctor recommends it, get more exercise. Walking is a good choice. Bit by bit, increase the amount you walk every day. Try for at least 30 minutes on most days of the week. You also may want to swim, bike, or do other activities. · Avoid or limit alcohol. Talk to your doctor about whether you can drink any alcohol. · Try to limit how much sodium you eat to less than 2,300 milligrams (mg) a day. Your doctor may ask you to try to eat less than 1,500 mg a day. · Eat plenty of fruits (such as bananas and oranges), vegetables, legumes, whole grains, and low-fat dairy products. · Lower the amount of saturated fat in your diet. Saturated fat is found in animal products such as milk, cheese, and meat. Limiting these foods may help you lose weight and also lower your risk for heart disease. · Do not smoke. Smoking increases your risk for heart attack and stroke. If you need help quitting, talk to your doctor about stop-smoking programs and medicines. These can increase your chances of quitting for good. When should you call for help? Call 911 anytime you think you may need emergency care. This may mean having symptoms that suggest that your blood pressure is causing a serious heart or blood vessel problem. Your blood pressure may be over 180/110. ? For example, call 911 if:  ? · You have symptoms of a heart attack.  These may include:  ¨ Chest pain or pressure, or a strange feeling in the chest.  ¨ Sweating. ¨ Shortness of breath. ¨ Nausea or vomiting. ¨ Pain, pressure, or a strange feeling in the back, neck, jaw, or upper belly or in one or both shoulders or arms. ¨ Lightheadedness or sudden weakness. ¨ A fast or irregular heartbeat. ? · You have symptoms of a stroke. These may include:  ¨ Sudden numbness, tingling, weakness, or loss of movement in your face, arm, or leg, especially on only one side of your body. ¨ Sudden vision changes. ¨ Sudden trouble speaking. ¨ Sudden confusion or trouble understanding simple statements. ¨ Sudden problems with walking or balance. ¨ A sudden, severe headache that is different from past headaches. ? · You have severe back or belly pain. ?Do not wait until your blood pressure comes down on its own. Get help right away. ?Call your doctor now or seek immediate care if:  ? · Your blood pressure is much higher than normal (such as 180/110 or higher), but you don't have symptoms. ? · You think high blood pressure is causing symptoms, such as:  ¨ Severe headache. ¨ Blurry vision. ? Watch closely for changes in your health, and be sure to contact your doctor if:  ? · Your blood pressure measures 140/90 or higher at least 2 times. That means the top number is 140 or higher or the bottom number is 90 or higher, or both. ? · You think you may be having side effects from your blood pressure medicine. ? · Your blood pressure is usually normal, but it goes above normal at least 2 times. Where can you learn more? Go to http://paco-chavo.info/. Enter O428 in the search box to learn more about \"High Blood Pressure: Care Instructions. \"  Current as of: September 21, 2016  Content Version: 11.4  © 7544-6562 [employer]. Care instructions adapted under license by [facility] (which disclaims liability or warranty for this information).  If you have questions about a medical condition or this instruction, always ask your healthcare professional. [address] any warranty or liability for your use of this information. Learning About High Blood Sugar  What is high blood sugar? Your body turns the food you eat into glucose (sugar), which it uses for energy. But if your body isn't able to use the sugar right away, it can build up in your blood and lead to high blood sugar. When the amount of sugar in your blood stays too high for too much of the time, you may have diabetes. Diabetes is a disease that can cause serious health problems. The good news is that lifestyle changes may help you get your blood sugar back to normal and avoid or delay diabetes. What causes high blood sugar? Sugar (glucose) can build up in your blood if you:  · Are overweight. · Have a family history of diabetes. · Take certain medicines, such as steroids. What are the symptoms? Having high blood sugar may not cause any symptoms at all. Or it may make you feel very thirsty or very hungry. You may also urinate more often than usual, have blurry vision, or lose weight without trying. How is high blood sugar treated? You can take steps to lower your blood sugar level if you understand what makes it get higher. Your doctor may want you to learn how to test your blood sugar level at home. Then you can see how illness, stress, or different kinds of food or medicine raise or lower your blood sugar level. Other tests may be needed to see if you have diabetes. How can you prevent high blood sugar? · Watch your weight. If you're overweight, losing just a small amount of weight may help. Reducing fat around your waist is most important. · Limit the amount of calories, sweets, and unhealthy fat you eat. Ask your doctor if a dietitian can help you. A registered dietitian can help you create meal plans that fit your lifestyle.   · Get at least 30 minutes of exercise on most days of the week. Exercise helps control your blood sugar. It also helps you maintain a healthy weight. Walking is a good choice. You also may want to do other activities, such as running, swimming, cycling, or playing tennis or team sports. · If your doctor prescribed medicines, take them exactly as prescribed. Call your doctor if you think you are having a problem with your medicine. You will get more details on the specific medicines your doctor prescribes. Follow-up care is a key part of your treatment and safety. Be sure to make and go to all appointments, and call your doctor if you are having problems. It's also a good idea to know your test results and keep a list of the medicines you take. Where can you learn more? Go to http://paco-chavo.info/. Enter O108 in the search box to learn more about \"Learning About High Blood Sugar. \"  Current as of: March 13, 2017  Content Version: 11.4  © 1182-5353 [employer]. Care instructions adapted under license by [employer] (which disclaims liability or warranty for this information). If you have questions about a medical condition or this instruction, always ask your healthcare professional. [address] any warranty or liability for your use of this information. Transcepta Activation    Thank you for requesting access to [employer]. Please follow the instructions below to securely access and download your online medical record. [employer]a allows you to send messages to your doctor, view your test results, renew your prescriptions, schedule appointments, and more. How Do I Sign Up? 1. In your internet browser, go to [email or web address]  2. Click on the First Time User? Click Here link in the Sign In box. You will be redirect to the New Member Sign Up page. 3. Enter your [employer] Access Code exactly as it appears below.  You will not need to use this code after youve completed the sign-up process. If you do not sign up before the expiration date, you must request a new code. [employer] Access Code: [email or web address]  Expires: 2018  8:26 AM (This is the date your [employer] access code will )    4. Enter the last four digits of your Social Security Number (xxxx) and Date of Birth (mm/dd/yyyy) as indicated and click Submit. You will be taken to the next sign-up page. 5. Create a [employer] ID. This will be your [employer] login ID and cannot be changed, so think of one that is secure and easy to remember. 6. Create a [employer] password. You can change your password at any time. 7. Enter your Password Reset Question and Answer. This can be used at a later time if you forget your password. 8. Enter your e-mail address. You will receive e-mail notification when new information is available in [address]. 9. Click Sign Up. You can now view and download portions of your medical record. 10. Click the Download Summary menu link to download a portable copy of your medical information. Additional Information    If you have questions, please visit the Frequently Asked Questions section of the [employer] website at [email or web address] Remember, [employer] is NOT to be used for urgent needs. For medical emergencies, dial 911.

## 2018-04-18 NOTE — ED TRIAGE NOTES
Pt arrived c/o hand and knee pain, pt is diabetic and states he is out of insulin and test strips, BG is 211, A&ox4, states hand/knee pain is 10\10

## 2018-04-19 NOTE — ED NOTES
The L.C. met with the client in the ER Department room 18 to discuss their follow options. The client received the [facility] flyer and assistance with their prescriptions through the [facility]. The client is already connected with the  Department and was attached on 11- at the [address]. The client is unemployed and has no insurance at the present time. The client stated they will continue receiving primary care through the [facility]. The clients follow up plan is to attend the [facility] and follow up with primary care.

## 2018-04-26 ENCOUNTER — OFFICE VISIT (OUTPATIENT)
Dept: ORTHOPEDIC SURGERY | Facility: CLINIC | Age: 53
End: 2018-04-26

## 2018-04-26 VITALS
DIASTOLIC BLOOD PRESSURE: 100 MMHG | TEMPERATURE: 97.7 F | SYSTOLIC BLOOD PRESSURE: 147 MMHG | WEIGHT: 178.4 LBS | RESPIRATION RATE: 18 BRPM | OXYGEN SATURATION: 99 % | HEIGHT: 67 IN | BODY MASS INDEX: 28 KG/M2 | HEART RATE: 75 BPM

## 2018-04-26 DIAGNOSIS — M25.561 CHRONIC PAIN OF RIGHT KNEE: ICD-10-CM

## 2018-04-26 DIAGNOSIS — M17.12 PRIMARY OSTEOARTHRITIS OF LEFT KNEE: ICD-10-CM

## 2018-04-26 DIAGNOSIS — M79.642 LEFT HAND PAIN: ICD-10-CM

## 2018-04-26 DIAGNOSIS — M25.562 CHRONIC PAIN OF LEFT KNEE: ICD-10-CM

## 2018-04-26 DIAGNOSIS — G89.29 CHRONIC PAIN OF RIGHT KNEE: ICD-10-CM

## 2018-04-26 DIAGNOSIS — M18.12 PRIMARY OSTEOARTHRITIS OF FIRST CARPOMETACARPAL JOINT OF LEFT HAND: Primary | ICD-10-CM

## 2018-04-26 DIAGNOSIS — M17.11 PRIMARY OSTEOARTHRITIS OF RIGHT KNEE: ICD-10-CM

## 2018-04-26 DIAGNOSIS — G89.29 CHRONIC PAIN OF LEFT KNEE: ICD-10-CM

## 2018-04-26 RX ORDER — BETAMETHASONE SODIUM PHOSPHATE AND BETAMETHASONE ACETATE 3; 3 MG/ML; MG/ML
6 INJECTION, SUSPENSION INTRA-ARTICULAR; INTRALESIONAL; INTRAMUSCULAR; SOFT TISSUE ONCE
Qty: 0.5 ML | Refills: 0
Start: 2018-04-26 | End: 2018-04-26

## 2018-04-26 RX ORDER — BUPIVACAINE HYDROCHLORIDE 2.5 MG/ML
0.5 INJECTION, SOLUTION EPIDURAL; INFILTRATION; INTRACAUDAL ONCE
Qty: 0.5 ML | Refills: 0
Start: 2018-04-26 | End: 2018-04-26

## 2018-04-26 RX ORDER — BUPIVACAINE HYDROCHLORIDE 2.5 MG/ML
8 INJECTION, SOLUTION EPIDURAL; INFILTRATION; INTRACAUDAL ONCE
Qty: 8 ML | Refills: 0
Start: 2018-04-26 | End: 2018-04-26

## 2018-04-26 NOTE — PROGRESS NOTES
Patient: Francia Glasgow                MRN: [id number]       SSN: [id number]  YOB: 1965        AGE: 46 y.o. SEX: male    PCP: None  04/26/18    Chief Complaint   Patient presents with    Hand Pain     Left    Knee Pain     Faraz     HISTORY:  Francia Glasgow is a 46 y.o. male who is seen for left hand and bilateral knee pain. She reports knee pain for the past 10 years. She reports difficulty walking and standing. She states she injured her left leg around age 13 in a sledding injury. He reports that he was sliding down a hillside and had a degloving injury of his left thigh. He states he suffered a lot of nerve and tissue damage as a result. He notes continued numbness and tingling in his left thigh. He states that he feels that his knees are going to give out on him. He reports left hand pain for the past year with no history of injury. She states she has a throbbing and aching pain in her left hand. He notes pain when moving his left thumb. Pain Assessment  4/26/2018   Location of Pain Knee   Location Modifiers Left;Right   Severity of Pain 10   Quality of Pain Sharp; Aching;Cracking   Duration of Pain Persistent   Frequency of Pain Constant   Aggravating Factors Walking;Standing;Bending   Limiting Behavior Yes   Relieving Factors Nothing   Result of Injury No     Occupation, etc:  Mr. Ever Tang is currently applying for social security disability benefits for his knees, left hand, h/o pneumothorax, and diabetes. He states he has been denied once but now has a . He previously worked as a  and  for [employer]. He has not worked for the past year. He is originally from [place] and moved to this area in 1992. He states that he had a lot of family in this area. .  He states he was homeless but then joined a ministry program through [facility]. He is currently living in a group home with 20-30 other people.  Current weight is 178 pounds. He is 5'7\" tall. He is an insulin dependent diabetic. He is not hypertensive. He reports a h/o bilateral pneumothorax. No results found for: HBA1C, HGBE8, QND6NLQS, HOY5TWQC, UAP1PIUX  Weight Metrics 4/26/2018 4/18/2018 4/18/2018 1/6/2018   Weight 178 lb 6.4 oz 79 lb 4.8 oz 175 lb 160 lb   BMI 27.94 kg/m2 12.42 kg/m2 27.41 kg/m2 -       There is no problem list on file for this patient. REVIEW OF SYSTEMS: All Below are Negative except: See HPI   Constitutional: negative for fever, chills, and weight loss. Cardiovascular: negative for chest pain, claudication, leg swelling, SOB, RASHID   Gastrointestinal: Negative for pain, N/V/C/D, Blood in stool or urine, dysuria,  hematuria, incontinence, pelvic pain. Musculoskeletal: See HPI   Neurological: Negative for dizziness and weakness. Negative for headaches, Visual changes, confusion, seizures   Phychiatric/Behavioral: Negative for depression, memory loss, substance  abuse. Extremities: Negative for hair changes, rash, or skin lesion changes. Hematologic: Negative for bleeding problems, bruising, pallor or swollen lymph  nodes   Peripheral Vascular: No calf pain, no circulation deficits. Social History     Social History    Marital status: SINGLE     Spouse name: N/A    Number of children: N/A    Years of education: N/A     Occupational History    Not on file. Social History Main Topics    Smoking status: Former Smoker    Smokeless tobacco: Never Used    Alcohol use Yes    Drug use: Not on file    Sexual activity: Not on file     Other Topics Concern    Not on file     Social History Narrative      No Known Allergies   Current Outpatient Prescriptions   Medication Sig    metFORMIN (GLUCOPHAGE) 500 mg tablet Take 1 Tab by mouth two (2) times daily (with meals).  lisinopril (PRINIVIL) 10 mg tablet Take 1 Tab by mouth daily for 30 days.  insulin syringe-needle U-100 40 Units by SubCUTAneous route two (2) times a day.  In the AM and PM    glucose blood VI test strips (BLOOD GLUCOSE TEST) strip Check blood sugar three times a day prior to insulin administration    hydrocortisone (CORTIZONE) 0.5 % ointment Apply  to affected area two (2) times a day for 10 days. Apply to affected area    diphenhydrAMINE (BENADRYL) 25 mg capsule Take 1-2 tabs every 6 hours as needed. No current facility-administered medications for this visit. PHYSICAL EXAMINATION:  Visit Vitals    BP (!) 147/100    Pulse 75    Temp 97.7 °F (36.5 °C) (Oral)    Resp 18    Ht 5' 7\" (1.702 m)    Wt 178 lb 6.4 oz (80.9 kg)    SpO2 99%    BMI 27.94 kg/m2      ORTHO EXAMINATION:  Examination Right knee Left knee   Skin Intact Intact   Range of motion 120-0 120-0   Effusion - -   Medial joint line tenderness + +   Lateral joint line tenderness - -   Popliteal tenderness - -   Osteophytes palpable + +   Cats - -   Patella crepitus + +   Anterior drawer - -   Lateral laxity - -   Medial laxity - -   Varus deformity - -   Valgus deformity - -   Pretibial edema - -   Calf tenderness - -   Large stellate triangular scar from prior laceration on the anterior left thigh  Examination Right Hand Left Hand   Skin Intact Intact   Deformity - -   Swelling - -   Tenderness - -   Finger flexion Full Full   Finger extension Full Full   Sensation Normal Normal   Capillary refill Normal Normal   Heberden's nodes - -   Dupuytren's - -   Wearing a soft wrist splint on his left hand  +, crank and grind    PROCEDURE:  After discussing treatment options, patient's knees were injected with 4 cc Marcaine and 1/2 cc Celestone. After discussing treatment options, patient's left basal joint was injected with 1/2 cc Marcaine and 1/2 cc Celestone.     Chart reviewed for the following:   Michael Orona MD, have reviewed the History, Physical and updated the Allergic reactions for [address] performed immediately prior to start of procedure:  Eliza DANIELS Lamin Patel MD, have performed the following reviews on Florentino Alfaro prior to the start of the procedure:            * Patient was identified by name and date of birth   * Agreement on procedure being performed was verified  * Risks and Benefits explained to the patient  * Procedure site verified and marked as necessary  * Patient was positioned for comfort  * Consent was obtained     Time: 3:05 PM     Date of procedure: 4/26/2018    Procedure performed by:  Gurvinder Saucedo MD    Mr. Ezekiel Villalba tolerated the procedure well with no complications. RADIOGRAPHS:  XR BILATERAL KNEE 4/26/18  IMPRESSION:  Three views - No fractures, no effusion, mild joint space narrowing, + osteophytes present. IKDC Grade B. Condylar squaring and flattening. XR LEFT HAND 1/10/18  1.  No evidence of acute abnormality. 2. Moderate degenerative/arthritic change at the first [facility] and MCP joints.  -I have independently reviewed these images during this office visit. -Dr. Evangelista Deter:      ICD-10-CM ICD-9-CM    1. Primary osteoarthritis of first carpometacarpal joint of left hand M18.12 715.14 betamethasone (CELESTONE SOLUSPAN) 6 mg/mL injection      BETAMETHASONE ACETATE & SODIUM PHOSPHATE INJECTION 3 MG EA.      DRAIN/INJECT SMALL JOINT/BURSA      bupivacaine, PF, (MARCAINE, PF,) 0.25 % (2.5 mg/mL) injection   2. Chronic pain of right knee M25.561 719.46 AMB POC X-RAY KNEE 3 VIEW    G89.29 338.29 betamethasone (CELESTONE SOLUSPAN) 6 mg/mL injection      BETAMETHASONE ACETATE & SODIUM PHOSPHATE INJECTION 3 MG EA.      DRAIN/INJECT LARGE JOINT/BURSA      bupivacaine, PF, (MARCAINE, PF,) 0.25 % (2.5 mg/mL) injection   3.  Chronic pain of left knee M25.562 719.46 AMB POC X-RAY KNEE 3 VIEW    G89.29 338.29 betamethasone (CELESTONE SOLUSPAN) 6 mg/mL injection      BETAMETHASONE ACETATE & SODIUM PHOSPHATE INJECTION 3 MG EA.      DRAIN/INJECT LARGE JOINT/BURSA      bupivacaine, PF, (MARCAINE, PF,) 0.25 % (2.5 mg/mL) injection 4. Primary osteoarthritis of right knee M17.11 715.16 betamethasone (CELESTONE SOLUSPAN) 6 mg/mL injection      BETAMETHASONE ACETATE & SODIUM PHOSPHATE INJECTION 3 MG EA.      DRAIN/INJECT LARGE JOINT/BURSA      bupivacaine, PF, (MARCAINE, PF,) 0.25 % (2.5 mg/mL) injection   5. Primary osteoarthritis of left knee M17.12 715.16 betamethasone (CELESTONE SOLUSPAN) 6 mg/mL injection      BETAMETHASONE ACETATE & SODIUM PHOSPHATE INJECTION 3 MG EA.      DRAIN/INJECT LARGE JOINT/BURSA      bupivacaine, PF, (MARCAINE, PF,) 0.25 % (2.5 mg/mL) injection   6. Left hand pain M79.642 729.5 betamethasone (CELESTONE SOLUSPAN) 6 mg/mL injection      BETAMETHASONE ACETATE & SODIUM PHOSPHATE INJECTION 3 MG EA.      DRAIN/INJECT SMALL JOINT/BURSA      bupivacaine, PF, (MARCAINE, PF,) 0.25 % (2.5 mg/mL) injection     PLAN:  After discussing treatment options, patient's knees were injected with 4 cc Marcaine and 1/2 cc Celestone. After discussing treatment options, patient's left basal joint was injected with 1/2 cc Marcaine and 1/2 cc Celestone. Michaelyn Daily He will follow up as needed. He was placed on permanent full hand restrictions today.      Scribed by Ramón Stacy ([facility]) as dictated by Alyssa Miles MD

## 2018-04-26 NOTE — PATIENT INSTRUCTIONS
Knee Arthritis: Exercises  Your Care Instructions  Here are some examples of exercises for knee arthritis. Start each exercise slowly. Ease off the exercise if you start to have pain. Your doctor or physical therapist will tell you when you can start these exercises and which ones will work best for you. How to do the exercises  Knee flexion with heel slide    1. Lie on your back with your knees bent. 2. Slide your heel back by bending your affected knee as far as you can. Then hook your other foot around your ankle to help pull your heel even farther back. 3. Hold for about 6 seconds, then rest for up to 10 seconds. 4. Repeat 8 to 12 times. 5. Switch legs and repeat steps 1 through 4, even if only one knee is sore. Quad sets    1. Sit with your affected leg straight and supported on the floor or a firm bed. Place a small, rolled-up towel under your knee. Your other leg should be bent, with that foot flat on the floor. 2. Tighten the thigh muscles of your affected leg by pressing the back of your knee down into the towel. 3. Hold for about 6 seconds, then rest for up to 10 seconds. 4. Repeat 8 to 12 times. 5. Switch legs and repeat steps 1 through 4, even if only one knee is sore. Straight-leg raises to the front    1. Lie on your back with your good knee bent so that your foot rests flat on the floor. Your affected leg should be straight. Make sure that your low back has a normal curve. You should be able to slip your hand in between the floor and the small of your back, with your palm touching the floor and your back touching the back of your hand. 2. Tighten the thigh muscles in your affected leg by pressing the back of your knee flat down to the floor. Hold your knee straight. 3. Keeping the thigh muscles tight and your leg straight, lift your affected leg up so that your heel is about 12 inches off the floor. Hold for about 6 seconds, then lower slowly.   4. Relax for up to 10 seconds between repetitions. 5. Repeat 8 to 12 times. 6. Switch legs and repeat steps 1 through 5, even if only one knee is sore. Active knee flexion    1. Lie on your stomach with your knees straight. If your kneecap is uncomfortable, roll up a washcloth and put it under your leg just above your kneecap. 2. Lift the foot of your affected leg by bending the knee so that you bring the foot up toward your buttock. If this motion hurts, try it without bending your knee quite as far. This may help you avoid any painful motion. 3. Slowly move your leg up and down. 4. Repeat 8 to 12 times. 5. Switch legs and repeat steps 1 through 4, even if only one knee is sore. Quadriceps stretch (facedown)    1. Lie flat on your stomach, and rest your face on the floor. 2. Wrap a towel or belt strap around the lower part of your affected leg. Then use the towel or belt strap to slowly pull your heel toward your buttock until you feel a stretch. 3. Hold for about 15 to 30 seconds, then relax your leg against the towel or belt strap. 4. Repeat 2 to 4 times. 5. Switch legs and repeat steps 1 through 4, even if only one knee is sore. Stationary exercise bike    1. If you do not have a stationary exercise bike at home, you can find one to ride at your local health club or community center. 2. Adjust the height of the bike seat so that your knee is slightly bent when your leg is extended downward. If your knee hurts when the pedal reaches the top, you can raise the seat so that your knee does not bend as much. 3. Start slowly. At first, try to do 5 to 10 minutes of cycling with little to no resistance. Then increase your time and the resistance bit by bit until you can do 20 to 30 minutes without pain. 4. If you start to have pain, rest your knee until your pain gets back to the level that is normal for you. Or cycle for less time or with less effort. Follow-up care is a key part of your treatment and safety.  Be sure to make and go to all appointments, and call your doctor if you are having problems. It's also a good idea to know your test results and keep a list of the medicines you take. Where can you learn more? Go to http://paco-chavo.info/. Enter C159 in the search box to learn more about \"Knee Arthritis: Exercises. \"  Current as of: March 21, 2017  Content Version: 11.4  © 2006-2017 [employer]. Care instructions adapted under license by [employer] ([employer]) (which disclaims liability or warranty for this information). If you have questions about a medical condition or this instruction, always ask your healthcare professional. [place] [address] any warranty or liability for your use of this information. Thumb Arthritis: Exercises  Your Care Instructions  Here are some examples of exercises for thumb arthritis. Start each exercise slowly. Ease off the exercise if you start to have pain. Your doctor or your physical or occupational therapist will tell you when you can start these exercises and which ones will work best for you. How to do the exercises  Thumb IP flexion    6. Place your forearm and hand on a table with your affected thumb pointing up. 7. With your other hand, hold your thumb steady just below the joint nearest your thumbnail. 8. Bend the tip of your thumb downward, then straighten it. 9. Repeat 8 to 12 times. 10. Switch hands and repeat steps 1 through 4, even if only one thumb is sore. Thumb MP flexion    6. Place your forearm and hand on a table with your affected thumb pointing up. 7. With your other hand, hold the base of your thumb and palm steady. 8. Bend your thumb downward where it meets your palm, then straighten it. 9. Repeat 8 to 12 times. 10. Switch hands and repeat steps 1 through 4, even if only one thumb is sore. Thumb opposition    7. With your affected hand, point your fingers and thumb straight up.  Your wrist should be relaxed, following the line of your fingers and thumb. 8. Touch your affected thumb to each finger, one finger at a time. This will look like an \"okay\" sign, but try to keep your other fingers straight and pointing upward as much as you can. 9. Repeat 8 to 12 times. 10. Switch hands and repeat steps 1 through 3, even if only one thumb is sore. Follow-up care is a key part of your treatment and safety. Be sure to make and go to all appointments, and call your doctor if you are having problems. It's also a good idea to know your test results and keep a list of the medicines you take. Where can you learn more? Go to http://paco-chavo.info/. Enter S617 in the search box to learn more about \"Thumb Arthritis: Exercises. \"  Current as of: March 21, 2017  Content Version: 11.4  © 8958-0398 [employer]. Care instructions adapted under license by [employer] (which disclaims liability or warranty for this information). If you have questions about a medical condition or this instruction, always ask your healthcare professional. [address] any warranty or liability for your use of this information.

## 2018-04-26 NOTE — LETTER
4/26/2018 3:15 PM 
 
Mr. Lillian Escobar [address] [facility] [address] Full work restrictions for injuries to the following: 
Left Hand PATIENT'S NAME: Lillian Escobar   DATE: 4/26/2018 LIFTING:   The patient can lift no more than 10 pounds at a time CLIMBING:   The patient cannot climb ladders HAND USE:   The patient cannot participate in activities requiring      gripping, pushing or pulling. OVERHEAD WORK:  The patient can participate in activities requiring overhead     use of arms or hands on a intermittent basis only, and no     more than 10 minutes at a time. CLERICAL WORK:   The patient can participate in desk work, typing, or use of     a cash register no more than 1 hour at one time, and must     have intermittent breaks. These restrictions are in effect for the above named patient From: 4/26/18  TO:Permanent Sincerely, 
 
 
 
 
 
 
Moy Vidal MD

## 2018-05-01 ENCOUNTER — HOSPITAL ENCOUNTER (OUTPATIENT)
Dept: LAB | Age: 53
Discharge: HOME | End: 2018-05-01

## 2018-05-01 ENCOUNTER — OFFICE VISIT (OUTPATIENT)
Dept: FAMILY MEDICINE CLINIC | Age: 53
End: 2018-05-01

## 2018-05-01 VITALS
TEMPERATURE: 98.2 F | SYSTOLIC BLOOD PRESSURE: 145 MMHG | DIASTOLIC BLOOD PRESSURE: 94 MMHG | HEART RATE: 80 BPM | HEIGHT: 67 IN | BODY MASS INDEX: 27.56 KG/M2 | RESPIRATION RATE: 20 BRPM | WEIGHT: 175.6 LBS | OXYGEN SATURATION: 95 %

## 2018-05-01 DIAGNOSIS — Z13.9 SCREENING PROCEDURE: ICD-10-CM

## 2018-05-01 DIAGNOSIS — Z76.89 ENCOUNTER TO ESTABLISH CARE: Primary | ICD-10-CM

## 2018-05-01 DIAGNOSIS — Z12.11 COLON CANCER SCREENING: ICD-10-CM

## 2018-05-01 DIAGNOSIS — M25.50 ARTHRALGIA, UNSPECIFIED JOINT: ICD-10-CM

## 2018-05-01 DIAGNOSIS — I10 ESSENTIAL HYPERTENSION: ICD-10-CM

## 2018-05-01 DIAGNOSIS — Z79.4 TYPE 2 DIABETES MELLITUS WITH HYPERGLYCEMIA, WITH LONG-TERM CURRENT USE OF INSULIN (HCC): ICD-10-CM

## 2018-05-01 DIAGNOSIS — Z76.89 ENCOUNTER TO ESTABLISH CARE: ICD-10-CM

## 2018-05-01 DIAGNOSIS — Z59.9 FINANCIAL DIFFICULTIES: ICD-10-CM

## 2018-05-01 DIAGNOSIS — E11.65 TYPE 2 DIABETES MELLITUS WITH HYPERGLYCEMIA, WITH LONG-TERM CURRENT USE OF INSULIN (HCC): ICD-10-CM

## 2018-05-01 DIAGNOSIS — R76.8 HEPATITIS C ANTIBODY TEST POSITIVE: ICD-10-CM

## 2018-05-01 DIAGNOSIS — R80.9 MICROALBUMINURIA: ICD-10-CM

## 2018-05-01 LAB
25(OH)D3 SERPL-MCNC: 33.1 NG/ML (ref 30–100)
ALBUMIN SERPL-MCNC: 3.6 G/DL (ref 3.4–5)
ALBUMIN/GLOB SERPL: 1.1 {RATIO} (ref 0.8–1.7)
ALP SERPL-CCNC: 85 U/L (ref 45–117)
ALT SERPL-CCNC: 61 U/L (ref 16–61)
AMPHET UR QL SCN: NEGATIVE
ANION GAP SERPL CALC-SCNC: 7 MMOL/L (ref 3–18)
AST SERPL-CCNC: 53 U/L (ref 15–37)
BARBITURATES UR QL SCN: NEGATIVE
BASOPHILS # BLD: 0.1 K/UL (ref 0–0.06)
BASOPHILS NFR BLD: 1 % (ref 0–2)
BENZODIAZ UR QL: NEGATIVE
BILIRUB SERPL-MCNC: 0.4 MG/DL (ref 0.2–1)
BUN SERPL-MCNC: 12 MG/DL (ref 7–18)
BUN/CREAT SERPL: 14 (ref 12–20)
CALCIUM SERPL-MCNC: 8.9 MG/DL (ref 8.5–10.1)
CANNABINOIDS UR QL SCN: NEGATIVE
CHLORIDE SERPL-SCNC: 102 MMOL/L (ref 100–108)
CHOLEST SERPL-MCNC: 123 MG/DL
CO2 SERPL-SCNC: 30 MMOL/L (ref 21–32)
COCAINE UR QL SCN: NEGATIVE
CREAT SERPL-MCNC: 0.86 MG/DL (ref 0.6–1.3)
CREAT UR-MCNC: 114 MG/DL (ref 30–125)
DIFFERENTIAL METHOD BLD: ABNORMAL
EOSINOPHIL # BLD: 0.3 K/UL (ref 0–0.4)
EOSINOPHIL NFR BLD: 8 % (ref 0–5)
ERYTHROCYTE [DISTWIDTH] IN BLOOD BY AUTOMATED COUNT: 12.5 % (ref 11.6–14.5)
EST. AVERAGE GLUCOSE BLD GHB EST-MCNC: 169 MG/DL
ETHANOL SERPL-MCNC: <3 MG/DL (ref 0–3)
GLOBULIN SER CALC-MCNC: 3.4 G/DL (ref 2–4)
GLUCOSE SERPL-MCNC: 83 MG/DL (ref 74–99)
HAV IGM SER QL: NEGATIVE
HBA1C MFR BLD: 7.5 % (ref 4.2–5.6)
HBV CORE IGM SER QL: NEGATIVE
HBV SURFACE AG SER QL: 0.19 INDEX
HBV SURFACE AG SER QL: NEGATIVE
HCT VFR BLD AUTO: 43.7 % (ref 36–48)
HCV AB SER IA-ACNC: >11 INDEX
HCV AB SERPL QL IA: POSITIVE
HCV COMMENT,HCGAC: ABNORMAL
HDLC SERPL-MCNC: 57 MG/DL (ref 40–60)
HDLC SERPL: 2.2 {RATIO} (ref 0–5)
HDSCOM,HDSCOM: NORMAL
HGB BLD-MCNC: 15.6 G/DL (ref 13–16)
LDLC SERPL CALC-MCNC: 41.4 MG/DL (ref 0–100)
LIPID PROFILE,FLP: NORMAL
LYMPHOCYTES # BLD: 1.7 K/UL (ref 0.9–3.6)
LYMPHOCYTES NFR BLD: 43 % (ref 21–52)
MAGNESIUM SERPL-MCNC: 1.7 MG/DL (ref 1.6–2.6)
MCH RBC QN AUTO: 28.5 PG (ref 24–34)
MCHC RBC AUTO-ENTMCNC: 35.7 G/DL (ref 31–37)
MCV RBC AUTO: 79.7 FL (ref 74–97)
METHADONE UR QL: NEGATIVE
MICROALBUMIN UR-MCNC: 3.92 MG/DL (ref 0–3)
MICROALBUMIN/CREAT UR-RTO: 34 MG/G (ref 0–30)
MONOCYTES # BLD: 0.5 K/UL (ref 0.05–1.2)
MONOCYTES NFR BLD: 12 % (ref 3–10)
NEUTS SEG # BLD: 1.5 K/UL (ref 1.8–8)
NEUTS SEG NFR BLD: 36 % (ref 40–73)
OPIATES UR QL: NEGATIVE
PCP UR QL: NEGATIVE
PLATELET # BLD AUTO: 343 K/UL (ref 135–420)
PMV BLD AUTO: 9.9 FL (ref 9.2–11.8)
POTASSIUM SERPL-SCNC: 4.1 MMOL/L (ref 3.5–5.5)
PROT SERPL-MCNC: 7 G/DL (ref 6.4–8.2)
PSA SERPL-MCNC: 0.2 NG/ML (ref 0–4)
RBC # BLD AUTO: 5.48 M/UL (ref 4.7–5.5)
RPR SER QL: NONREACTIVE
SODIUM SERPL-SCNC: 139 MMOL/L (ref 136–145)
SP1: ABNORMAL
SP2: ABNORMAL
SP3: ABNORMAL
T3FREE SERPL-MCNC: 3.4 PG/ML (ref 2.18–3.98)
T4 FREE SERPL-MCNC: 1.2 NG/DL (ref 0.7–1.5)
TRIGL SERPL-MCNC: 123 MG/DL (ref ?–150)
TSH SERPL DL<=0.05 MIU/L-ACNC: 1.32 UIU/ML (ref 0.36–3.74)
VLDLC SERPL CALC-MCNC: 24.6 MG/DL
WBC # BLD AUTO: 4.1 K/UL (ref 4.6–13.2)

## 2018-05-01 PROCEDURE — 84439 ASSAY OF FREE THYROXINE: CPT | Performed by: NURSE PRACTITIONER

## 2018-05-01 PROCEDURE — 83036 HEMOGLOBIN GLYCOSYLATED A1C: CPT | Performed by: NURSE PRACTITIONER

## 2018-05-01 PROCEDURE — 84443 ASSAY THYROID STIM HORMONE: CPT | Performed by: NURSE PRACTITIONER

## 2018-05-01 PROCEDURE — 80061 LIPID PANEL: CPT | Performed by: NURSE PRACTITIONER

## 2018-05-01 PROCEDURE — 82274 ASSAY TEST FOR BLOOD FECAL: CPT | Performed by: NURSE PRACTITIONER

## 2018-05-01 PROCEDURE — 86592 SYPHILIS TEST NON-TREP QUAL: CPT | Performed by: NURSE PRACTITIONER

## 2018-05-01 PROCEDURE — 82043 UR ALBUMIN QUANTITATIVE: CPT | Performed by: NURSE PRACTITIONER

## 2018-05-01 PROCEDURE — 85025 COMPLETE CBC W/AUTO DIFF WBC: CPT | Performed by: NURSE PRACTITIONER

## 2018-05-01 PROCEDURE — 80053 COMPREHEN METABOLIC PANEL: CPT | Performed by: NURSE PRACTITIONER

## 2018-05-01 PROCEDURE — 83735 ASSAY OF MAGNESIUM: CPT | Performed by: NURSE PRACTITIONER

## 2018-05-01 PROCEDURE — 80307 DRUG TEST PRSMV CHEM ANLYZR: CPT | Performed by: NURSE PRACTITIONER

## 2018-05-01 PROCEDURE — 84481 FREE ASSAY (FT-3): CPT | Performed by: NURSE PRACTITIONER

## 2018-05-01 PROCEDURE — 82306 VITAMIN D 25 HYDROXY: CPT | Performed by: NURSE PRACTITIONER

## 2018-05-01 PROCEDURE — 87389 HIV-1 AG W/HIV-1&-2 AB AG IA: CPT | Performed by: NURSE PRACTITIONER

## 2018-05-01 PROCEDURE — 80074 ACUTE HEPATITIS PANEL: CPT | Performed by: NURSE PRACTITIONER

## 2018-05-01 PROCEDURE — 87491 CHLMYD TRACH DNA AMP PROBE: CPT | Performed by: NURSE PRACTITIONER

## 2018-05-01 PROCEDURE — 84153 ASSAY OF PSA TOTAL: CPT | Performed by: NURSE PRACTITIONER

## 2018-05-01 RX ORDER — LISINOPRIL 20 MG/1
20 TABLET ORAL DAILY
Qty: 30 TAB | Refills: 2 | Status: SHIPPED | OUTPATIENT
Start: 2018-05-01 | End: 2018-08-07 | Stop reason: SDUPTHER

## 2018-05-01 RX ORDER — NAPROXEN 500 MG/1
500 TABLET ORAL 2 TIMES DAILY WITH MEALS
COMMUNITY
End: 2018-05-01 | Stop reason: ALTCHOICE

## 2018-05-01 RX ORDER — DICLOFENAC SODIUM AND MISOPROSTOL 50; 200 MG/1; UG/1
1 TABLET, DELAYED RELEASE ORAL
Qty: 90 TAB | Refills: 3 | Status: SHIPPED | COMMUNITY
Start: 2018-05-01

## 2018-05-01 RX ORDER — INSULIN GLARGINE 100 [IU]/ML
INJECTION, SOLUTION SUBCUTANEOUS
Qty: 3 PEN | Refills: 0 | Status: SHIPPED | COMMUNITY
Start: 2018-05-01 | End: 2018-05-02 | Stop reason: SDUPTHER

## 2018-05-01 RX ORDER — DICLOFENAC SODIUM AND MISOPROSTOL 50; 200 MG/1; UG/1
1 TABLET, DELAYED RELEASE ORAL
Qty: 180 TAB | Refills: 0 | Status: SHIPPED | COMMUNITY
Start: 2018-05-01 | End: 2018-05-02 | Stop reason: SDUPTHER

## 2018-05-01 RX ORDER — INSULIN GLARGINE 100 [IU]/ML
INJECTION, SOLUTION SUBCUTANEOUS
Qty: 6 PEN | Refills: 3 | Status: SHIPPED | COMMUNITY
Start: 2018-05-01

## 2018-05-01 RX ORDER — LISINOPRIL 10 MG/1
TABLET ORAL
Qty: 30 TAB | Refills: 0
Start: 2018-05-01 | End: 2018-08-07 | Stop reason: DRUGHIGH

## 2018-05-01 SDOH — ECONOMIC STABILITY - INCOME SECURITY: PROBLEM RELATED TO HOUSING AND ECONOMIC CIRCUMSTANCES, UNSPECIFIED: Z59.9

## 2018-05-01 NOTE — LETTER
5/1/2018 [facility] [address] [facility], [id number] [facility], 1965, is picking up the following medications ordered from the [facility]: STOCK:  ARTHROTEC 50/0.2 MG #90 AND LANTUS SOLOSTAR #1 BOX. Lupe Gutierrez Patient's Signature: _____________________________ Today's Date: 5/1/2018

## 2018-05-01 NOTE — MR AVS SNAPSHOT
[address] [facility] [address] 
[phone] Patient: Rupa Oedn MRN: [id number] :1965 Visit Information Date & Time Provider Department Dept. Phone Encounter #  
 2018  9:00 AM Melissa Linn NP  [facility] [id number] Your Appointments 2018  9:30 AM  
NUTRITION COUNSELING with NURSE NAVIGATOR [address] [place] [address] ([address] [place] [address]) Appt Note: NPO/LAB REVIEW 1 hour 333 [facility] [id number]  
129 [facility] [address]  
  
    
 2018  8:45 AM  
CONSULT with Liv Hanks NP [address] [place] [address] ([address] [place] [address]) Appt Note: Depression [address] [facility] [phone]  
129 [facility] [address]  
  
    
 2018  9:00 AM  
LAB with [facility], LAB [address] [place] [address] ([address] [place] [address]) Appt Note: labs 333 [facility] [address]  
129 [facility] [id number]  
  
    
 2018 10:30 AM  
Follow Up with Melissa Linn NP [address] [place] [address] ([address] [place] [address]) Appt Note: 3 mth follow up  
 333 [facility] [id number]  
12[address] [facility] [address] Upcoming Health Maintenance Date Due Hepatitis C Screening 1965 DTaP/Tdap/Td series (1 - Tdap) 1986 FOBT Q 1 YEAR AGE 50-75 2015 Influenza Age 5 to Adult 2018 Allergies as of 2018  Review Complete On: 2018 By: Noah Pearl No Known Allergies Current Immunizations  Never Reviewed No immunizations on file. Not reviewed this visit You Were Diagnosed With   
  
 Codes Comments Colon cancer screening    -  Primary ICD-10-CM: Z12.11 ICD-9-CM: V76.51 Essential hypertension     ICD-10-CM: I10 
ICD-9-CM: 401.9 Type 2 diabetes mellitus with hyperglycemia, with long-term current use of insulin (HCC)     ICD-10-CM: E11.65, Z79.4 ICD-9-CM: 250.00, 790.29, V58.67 Financial difficulties     ICD-10-CM: Z59.8 ICD-9-CM: V60.2 Arthralgia, unspecified joint     ICD-10-CM: M25.50 ICD-9-CM: 719.40 Encounter to establish care     ICD-10-CM: Z76.89 
ICD-9-CM: V65.8 Vitals BP Pulse Temp Resp Height(growth percentile) Weight(growth percentile) (!) 145/94 80 98.2 °F (36.8 °C) 20 5' 7\" (1.702 m) 175 lb 9.6 oz (79.7 kg) SpO2 BMI Smoking Status 95% 27.5 kg/m2 Former Smoker Vitals History BMI and BSA Data Body Mass Index Body Surface Area  
 27.5 kg/m 2 1.94 m 2 Preferred Pharmacy Pharmacy Name Phone Neelam Carrillo [address] [place] [address]. [phone] Your Updated Medication List  
  
   
This list is accurate as of 5/1/18  9:56 AM.  Always use your most recent med list.  
  
  
  
  
 * diclofenac-miSOPROStol  mg-mcg per tablet Commonly known as:  ARTHROTEC 50 Take 1 Tab by mouth two (2) times daily as needed. For pain * diclofenac-miSOPROStol  mg-mcg per tablet Commonly known as:  ARTHROTEC 50 Take 1 Tab by mouth two (2) times daily as needed. For pain  
  
 diphenhydrAMINE 25 mg capsule Commonly known as:  BENADRYL Take 1-2 tabs every 6 hours as needed. glucose blood VI test strips strip Commonly known as:  blood glucose test  
Check blood sugar three times a day prior to insulin administration * insulin glargine 100 unit/mL (3 mL) Inpn Commonly known as:  Ricky Edge Inject 60 units daily * insulin glargine 100 unit/mL (3 mL) Inpn Commonly known as:  Ricky Edge Inject 60 units daily insulin syringe-needle U-100  
40 Units by SubCUTAneous route two (2) times a day. In the AM and PM  
  
 * lisinopril 20 mg tablet Commonly known as:  Feroz Peek Take 1 Tab by mouth daily. * lisinopril 10 mg tablet Commonly known as:  PRINIVIL Take 2 tabs daily until gone then start Lisinopril 20mg every day. * Notice: This list has 6 medication(s) that are the same as other medications prescribed for you. Read the directions carefully, and ask your doctor or other care provider to review them with you. Prescriptions Printed Refills  
 insulin glargine (LANTUS,BASAGLAR) 100 unit/mL (3 mL) inpn 3 Sig: Inject 60 units daily Class: Program  
 diclofenac-miSOPROStol (ARTHROTEC 50)  mg-mcg per tablet 3 Sig: Take 1 Tab by mouth two (2) times daily as needed. For pain  
 Class: Program  
 Route: Oral  
  
Prescriptions Sent to Mail Order Refills  
 insulin glargine (LANTUS,BASAGLAR) 100 unit/mL (3 mL) inpn 3 Sig: Inject 60 units daily Class: Program  
 Pharm[facility]: [facility] [address] [address] [facility] [address] [facility]. Ph #: [phone]  
 diclofenac-miSOPROStol (ARTHROTEC 50)  mg-mcg per tablet 3 Sig: Take 1 Tab by mouth two (2) times daily as needed. For pain  
 Class: Program  
 Pharmacy: [facility] [address] [address] [facility], [facility] [address]. Ph #: [phone] Route: Oral  
  
Prescriptions Sent to Pharmacy Refills  
 lisinopril (PRINIVIL, ZESTRIL) 20 mg tablet 2 Sig: Take 1 Tab by mouth daily. Class: Normal  
 Pharmacy: [facility] [address], [facility] [address]. Ph #: [phone] Route: Oral  
 insulin glargine (LANTUS,BASAGLAR) 100 unit/mL (3 mL) in 3 Sig: Inject 60 units daily Class: Program  
 Pharmacy: [facility] [address], [address] [facility].  Ph #: [phone]  
 diclofenac-miSOPROStol (ARTHROTEC 50)  mg-mcg per tablet 3  
 Sig: Take 1 Tab by mouth two (2) times daily as needed. For pain  
 Class: Program  
 Pharmacy: [facility] [address].  #: [phone] Route: Oral  
  
To-Do List   
 05/01/2018 Lab:  OCCULT BLOOD, IMMUNOASSAY (FIT) Introducing [facility]! [facility] introduces [employer] patient portal. Now you can access parts of your medical record, email your doctor's office, and request medication refills online. 1. In your internet browser, go to [email or web address] 2. Click on the First Time User? Click Here link in the Sign In box. You will see the New Member Sign Up page. 3. Enter your [employer] Access Code exactly as it appears below. You will not need to use this code after youve completed the sign-up process. If you do not sign up before the expiration date, you must request a new code. · [employer] Access Code: [email or web address] Expires: 7/17/2018  8:26 AM 
 
4. Enter the last four digits of your Social Security Number (xxxx) and Date of Birth (mm/dd/yyyy) as indicated and click Submit. You will be taken to the next sign-up page. 5. Create a [employer] ID. This will be your [employer] login ID and cannot be changed, so think of one that is secure and easy to remember. 6. Create a [employer] password. You can change your password at any time. 7. Enter your Password Reset Question and Answer. This can be used at a later time if you forget your password. 8. Enter your e-mail address. You will receive e-mail notification when new information is available in [address] 9. Click Sign Up. You can now view and download portions of your medical record. 10. Click the Download Summary menu link to download a portable copy of your medical information. If you have questions, please visit the Frequently Asked Questions section of the [employer] website. Remember, [employer] is NOT to be used for urgent needs. For medical emergencies, dial 911. Now available from your iPhone and Android! Please provide this summary of care documentation to your next provider. Your primary care clinician is listed as NONE. If you have any questions after today's visit, please call [phone].

## 2018-05-01 NOTE — PROGRESS NOTES
PET[facility]ISNEROS [facility]  [address], [address] [facility]  [phone] office/[phone] fax      5/1/2018    Reason for visit:   Chief Complaint   Patient presents with    Establish Care    Diabetes     patient out of medications for about 2 weeks    Hypertension    Hand Pain     patient has old injury to right hand and now has arthritis    Hepatitis C     patient says he finish treatment thru [facility] and now waiting for medication for hep C       Patient: Lennie Solano, 1965, [id number]       Primary MD: Nathan Lopez NP    Subjective:   Lennie Solano, a 46 y.o. male, who presents for Establish Care; Diabetes (patient out of medications for about 2 weeks); Hypertension; Hand Pain (patient has old injury to right hand and now has arthritis); and Hepatitis C (patient says he finish treatment thru [facility] and now waiting for medication for hep C)      Lives at [facility]   The history is provided by the patient (I used to see the [facility] clinic). Diabetes   The history is provided by the patient. This is a chronic (DX: age 52) problem. Treatments tried: I take Lantus 60 units daily. Metformin 500mg BID. Hypertension    The history is provided by the patient (Taking Lisinopril 10mg qd. I have 6-7 pills left). Hand Pain    The history is provided by the patient. This is a chronic (Dx with arthritis a few years ago.) problem. The problem occurs constantly. The problem has not changed since onset. The pain is present in the left hand. The pain is at a severity of 10/10. The pain is moderate. Treatments tried: taking naprosyn but it dont help. Knee Pain   The history is provided by the patient (Saw Dr Swapna Whatley last week and he said I have arthritis in both knees). This is a chronic problem. The problem occurs every several days (comes and goes). The problem has not changed since onset. Treatments tried: Naprosyn not effective.     Depression   The history is provided by the patient. This is a chronic (started because of chronic pain) problem. The problem occurs constantly. He has tried nothing (I used to be on med about 5-6 yrs ago. I cant remember name of med. ) for the symptoms.          Past Medical History:   Diagnosis Date    Arthralgia 5/2/2018    Arthritis     Knees and left wrist/hand    Closed fracture of left wrist 01/08/2018    Diabetes ([address]     Essential hypertension 5/2/2018    Hepatitis C     Hepatitis C antibody test positive 5/2/2018    Hypertension     Pneumothorax 06/30/2013    x 2 per patient    Sciatic nerve pain 10/06/2016    Type 2 diabetes mellitus with hyperglycemia, with long-term current use of insulin ([address] 5/2/2018       Past Surgical History:   Procedure Laterality Date    HX OTHER SURGICAL  1970    left thigh 350 stitches     HX OTHER SURGICAL      dog bite that caused nerve damage       Social History     Social History    Marital status: SINGLE     Spouse name: N/A    Number of children: 2    Years of education: ged     Occupational History    unemployed      Social History Main Topics    Smoking status: Former Smoker     Quit date: 2/7/2016    Smokeless tobacco: Never Used    Alcohol use 1.8 oz/week     3 Shots of liquor per week      Comment: Patient notes drinks \"often\" quit 2-3 years ago    Drug use: Yes     Special: Cocaine, Marijuana      Comment: quit 2-3 years ago    Sexual activity: Yes     Partners: Female     Other Topics Concern     Service No     [employer]    Blood Transfusions No    Caffeine Concern Yes    Occupational Exposure No    Hobby Hazards No    Sleep Concern Yes    Stress Concern Yes    Weight Concern No    Special Diet No    Back Care Yes    Exercise No    Bike Helmet No    Seat Belt Yes     Social History Narrative       No Known Allergies    Current Outpatient Prescriptions on File Prior to Visit   Medication Sig Dispense Refill    insulin syringe-needle U-100 40 Units by SubCUTAneous route two (2) times a day. In the AM and  Syringe 11    glucose blood VI test strips (BLOOD GLUCOSE TEST) strip Check blood sugar three times a day prior to insulin administration 100 Strip 0    diphenhydrAMINE (BENADRYL) 25 mg capsule Take 1-2 tabs every 6 hours as needed. 16 Cap 0     No current facility-administered medications on file prior to visit. Review of Systems   Constitutional:        I had the hep A and B vaccines in [place]. I will see if I can get the results. If not I am willing to get them again in order to get treatment for my Hep C   HENT: Negative. Eyes: Negative. Respiratory: Negative. No smoking   Cardiovascular:        See HPI   Gastrointestinal:        I believe I contracted Hep C from getting tattoos in prison. Endocrine:        See HPI   Genitourinary: Negative. Musculoskeletal:        See HPI   Skin: Negative. Allergic/Immunologic: Negative. Neurological: Negative. Hematological: Negative. Psychiatric/Behavioral: Positive for depression. No drinking nor illicit drugs. Objective:   Visit Vitals    BP (!) 145/94    Pulse 80    Temp 98.2 °F (36.8 °C)    Resp 20    Ht 5' 7\" (1.702 m)    Wt 175 lb 9.6 oz (79.7 kg)    SpO2 95%    BMI 27.5 kg/m2      Wt Readings from Last 3 Encounters:   05/01/18 175 lb 9.6 oz (79.7 kg)   04/26/18 178 lb 6.4 oz (80.9 kg)   04/18/18 79 lb 4.8 oz (36 kg)     Lab Results   Component Value Date/Time    Glucose 83 05/01/2018 09:32 AM    Glucose (POC) 211 (H) 04/18/2018 08:22 AM         Physical Exam   Constitutional: He is oriented to person, place, and time. He appears well-nourished. HENT:   Head: Atraumatic. Neck: Neck supple. Cardiovascular: Normal rate, regular rhythm and normal heart sounds. Pulmonary/Chest: Effort normal and breath sounds normal.   Abdominal: Bowel sounds are normal.   Musculoskeletal: Normal range of motion.    Neurological: He is alert and oriented to person, place, and time. Skin: Skin is warm and dry. Psychiatric: He has a normal mood and affect. His behavior is normal. Judgment and thought content normal.       Assessment:    Ward Fierro who has risk factors including (see above previous medical hx) and:       1. Encounter to establish care    - CBC WITH AUTOMATED DIFF; Future  - CHLAMYDIA/NEISSERIA AMPLIFICATION; Future  - ETHYL ALCOHOL; Future  - HEMOGLOBIN A1C WITH EAG; Future  - HEPATITIS PANEL, ACUTE; Future  - HIV 1/2 AG/AB, 4TH GENERATION,W RFLX CONFIRM; Future  - LIPID PANEL; Future  - VITAMIN D, 25 HYDROXY; Future  - TSH 3RD GENERATION; Future  - T4, FREE; Future  - T3, FREE; Future  - PROSTATE SPECIFIC AG; Future  - MICROALBUMIN, UR, RAND W/ MICROALB/CREAT RATIO; Future  - METABOLIC PANEL, COMPREHENSIVE; Future  - MAGNESIUM; Future    2. Essential hypertension  Pt to take lisinopril 10mg 2 tabs til all gone then take Lisinopril 20mg every day. - lisinopril (PRINIVIL, ZESTRIL) 20 mg tablet; Take 1 Tab by mouth daily. Dispense: 30 Tab; Refill: 2  - lisinopril (PRINIVIL) 10 mg tablet; Take 2 tabs daily until gone then start Lisinopril 20mg every day. Dispense: 30 Tab; Refill: 0    3. Type 2 diabetes mellitus with hyperglycemia, with long-term current use of insulin (HCC)    - insulin glargine (LANTUS,BASAGLAR) 100 unit/mL (3 mL) inpn; Inject 60 units daily  Dispense: 3 Pen; Refill: 0  - insulin glargine (LANTUS,BASAGLAR) 100 unit/mL (3 mL) inpn; Inject 60 units daily  Dispense: 6 Pen; Refill: 3  - sAXagliptin-metFORMIN (KOMBIGLYZE XR) 2.5-1,000 mg TM24; Take 1 Tab by mouth daily. For diabetes  Dispense: 90 Tab; Refill: 0  - sAXagliptin-metFORMIN (KOMBIGLYZE XR) 2.5-1,000 mg TM24; Take 1 Tab by mouth daily. For diabetes  Dispense: 90 Tab; Refill: 3    4. Microalbuminuria      5. Arthralgia, unspecified joint  Instructed pt on therapeutic interventions to try at home.  Apply ice/heat 3 times a day to help relieve pain; elevate extremity if swelling noted. Do not take any NSAIDs such as Motrin, Ibuprofen, Aleve or BC powder while taking this NSAID.     - diclofenac-miSOPROStol (ARTHROTEC 50)  mg-mcg per tablet; Take 1 Tab by mouth two (2) times daily as needed. For pain  Dispense: 90 Tab; Refill: 3  - diclofenac-miSOPROStol (ARTHROTEC 50)  mg-mcg per tablet; Take 1 Tab by mouth two (2) times daily as needed. For pain  Dispense: 180 Tab; Refill: 0    6. Hepatitis C antibody test positive    - HEPATITIS C QT BY PCR WITH REFLEX GENOTYPE; Future    7. Financial difficulties      8. Colon cancer screening  Pt received FIT kit and educated on how to obtain sample and how to return sample. Pt verb understanding.    - OCCULT BLOOD, IMMUNOASSAY (FIT); Future      See additional info under plan      Written instructions followed our verbal discussion of all information discussed above, pending tests ordered and future goals/plans. Patient expressed understanding of current diagnosis, planned testing, follow up and if needed to contact the office for any questions or concerns prior to the next visit. Plan:     Reviewed medication and completed the medication reconciliation with the patient. Reviewed side effects of medications with the patient. Questions were answered and patient verb understanding. Labs obtained to establish baseline, evaluate metabolic health, nutritional status, vitamin deficiencies and screening for at risk items based on the demographics of the patient, previous medical history and current social practices.  Will contact the patient in when all labs are resulted by phone to review and make lifestyle and medication recommendations. Follow up labs will be completed to monitor improvement prior to their next visit. NN will review labs at NPO/Lab review appt:  Please review new patient labs with patient at appt: 5/9/18  1) HIV neg  2) RPR neg  3) GC/Chl/Trich awaiting results  4) Hep- Hep C +.  Pt needs PCR drawn on 5/9/18  5) A1c 7.5. Cont lantus 60 units daily. Will order Kombiglyze via [facility] as sample and as a program medication. Pt needs to come into the office to sign TPC paperwork and to  samples if available. Delores Jacobo will replace metformin. 6) UDS neg  7) EtOH neg  8) Lipids good  9) Thy good  10) CMP AST slightly elevated. Will monitor 3m  11) Mag good  12) CBC WBC slightly low.  Will monitor in 3 months  13) Vit D good  14) FIT awaiting results  15) PSA good  16) Microalbumin elevated- possibly due to elevated A1c  Pt will need labs 1-2 weeks prior to follow up appt on 8/7/18  A1c, CBC, CMP    Orders Placed This Encounter    OCCULT BLOOD, IMMUNOASSAY (FIT)     Standing Status:   Future     Number of Occurrences:   1     Standing Expiration Date:   5/31/2018    CBC WITH AUTOMATED DIFF     Standing Status:   Future     Number of Occurrences:   1     Standing Expiration Date:   5/1/2018    CHLAMYDIA/NEISSERIA AMPLIFICATION     Standing Status:   Future     Number of Occurrences:   1     Standing Expiration Date:   5/1/2018     Order Specific Question:   Specimen type/source     Answer:   Urine [258]    ETHYL ALCOHOL     Standing Status:   Future     Number of Occurrences:   1     Standing Expiration Date:   5/1/2018    HEMOGLOBIN A1C WITH EAG     Standing Status:   Future     Number of Occurrences:   1     Standing Expiration Date:   5/1/2018    HEPATITIS PANEL, ACUTE     Standing Status:   Future     Number of Occurrences:   1     Standing Expiration Date:   5/1/2018    HIV 1/2 AG/AB, 4TH GENERATION,W RFLX CONFIRM     Standing Status:   Future     Number of Occurrences:   1     Standing Expiration Date:   5/1/2018    LIPID PANEL     Standing Status:   Future     Number of Occurrences:   1     Standing Expiration Date:   5/1/2018    VITAMIN D, 25 HYDROXY     Standing Status:   Future     Number of Occurrences:   1     Standing Expiration Date:   5/1/2018    TSH 3RD GENERATION     Standing Status: Future     Number of Occurrences:   1     Standing Expiration Date:   5/1/2018    T4, FREE     Standing Status:   Future     Number of Occurrences:   1     Standing Expiration Date:   5/1/2018    T3, FREE     Standing Status:   Future     Number of Occurrences:   1     Standing Expiration Date:   5/1/2018    PROSTATE SPECIFIC AG     Standing Status:   Future     Number of Occurrences:   1     Standing Expiration Date:   5/1/2018    MICROALBUMIN, UR, RAND W/ MICROALB/CREAT RATIO     Standing Status:   Future     Number of Occurrences:   1     Standing Expiration Date:   0/3/2762    METABOLIC PANEL, COMPREHENSIVE     Standing Status:   Future     Number of Occurrences:   1     Standing Expiration Date:   5/1/2018    MAGNESIUM     Standing Status:   Future     Number of Occurrences:   1     Standing Expiration Date:   5/1/2018    HEPATITIS C QT BY PCR WITH REFLEX GENOTYPE     This takes two tubes for complete test     Standing Status:   Future     Standing Expiration Date:   5/31/2018    HEMOGLOBIN A1C WITH EAG     Standing Status:   Future     Standing Expiration Date:   9/28/2018    CBC WITH AUTOMATED DIFF     Standing Status:   Future     Standing Expiration Date:   9/55/9689    METABOLIC PANEL, COMPREHENSIVE     Standing Status:   Future     Standing Expiration Date:   9/28/2018    lisinopril (PRINIVIL, ZESTRIL) 20 mg tablet     Sig: Take 1 Tab by mouth daily. Dispense:  30 Tab     Refill:  2    lisinopril (PRINIVIL) 10 mg tablet     Sig: Take 2 tabs daily until gone then start Lisinopril 20mg every day.      Dispense:  30 Tab     Refill:  0    insulin glargine (LANTUS,BASAGLAR) 100 unit/mL (3 mL) inpn     Sig: Inject 60 units daily     Dispense:  3 Pen     Refill:  0     Order Specific Question:   Expiration Date     Answer:   8/31/2020     Order Specific Question:   Lot#     Answer:   [id number]     Order Specific Question:        Answer:   SANOFI    insulin glargine (Chuy Kim) 100 unit/mL (3 mL) inpn     Sig: Inject 60 units daily     Dispense:  6 Pen     Refill:  3    diclofenac-miSOPROStol (ARTHROTEC 50)  mg-mcg per tablet     Sig: Take 1 Tab by mouth two (2) times daily as needed. For pain     Dispense:  90 Tab     Refill:  3    diclofenac-miSOPROStol (ARTHROTEC 50)  mg-mcg per tablet     Sig: Take 1 Tab by mouth two (2) times daily as needed. For pain     Dispense:  180 Tab     Refill:  0     Order Specific Question:   Expiration Date     Answer:   8/31/2021     Order Specific Question:   Lot#     Answer:   [id number]     Order Specific Question:        Answer:   PFIZER    sAXagliptin-metFORMIN (KOMBIGLYZE XR) 2.5-1,000 mg TM24     Sig: Take 1 Tab by mouth daily. For diabetes     Dispense:  90 Tab     Refill:  0    sAXagliptin-metFORMIN (KOMBIGLYZE XR) 2.5-1,000 mg TM24     Sig: Take 1 Tab by mouth daily. For diabetes     Dispense:  90 Tab     Refill:  3     Current Outpatient Prescriptions   Medication Sig Dispense Refill    sAXagliptin-metFORMIN (KOMBIGLYZE XR) 2.5-1,000 mg TM24 Take 1 Tab by mouth daily. For diabetes 90 Tab 0    sAXagliptin-metFORMIN (KOMBIGLYZE XR) 2.5-1,000 mg TM24 Take 1 Tab by mouth daily. For diabetes 90 Tab 3    lisinopril (PRINIVIL, ZESTRIL) 20 mg tablet Take 1 Tab by mouth daily. 30 Tab 2    lisinopril (PRINIVIL) 10 mg tablet Take 2 tabs daily until gone then start Lisinopril 20mg every day. 30 Tab 0    insulin glargine (LANTUS,BASAGLAR) 100 unit/mL (3 mL) inpn Inject 60 units daily 3 Pen 0    insulin glargine (LANTUS,BASAGLAR) 100 unit/mL (3 mL) inpn Inject 60 units daily 6 Pen 3    diclofenac-miSOPROStol (ARTHROTEC 50)  mg-mcg per tablet Take 1 Tab by mouth two (2) times daily as needed. For pain 90 Tab 3    diclofenac-miSOPROStol (ARTHROTEC 50)  mg-mcg per tablet Take 1 Tab by mouth two (2) times daily as needed.  For pain 180 Tab 0    insulin syringe-needle U-100 40 Units by SubCUTAneous route two (2) times a day. In the AM and  Syringe 11    glucose blood VI test strips (BLOOD GLUCOSE TEST) strip Check blood sugar three times a day prior to insulin administration 100 Strip 0    diphenhydrAMINE (BENADRYL) 25 mg capsule Take 1-2 tabs every 6 hours as needed. 16 Cap 0       Follow-up Disposition:  Return in about 3 months (around 8/1/2018). Call [facility] for financial assistance- handout with phone number and address given to patient. \"No Show policy was reviewed with the patient. The services affected are the nurse navigator and the provider. No show appointments include missing labs for a future scheduled appointment, Pap/pelvics, arriving to appointment more than 10 minutes late, and calling to cancel appointment less than 24 hours in advance. After the 3rd No Show, the patient will be removed from the Foundation to include medications for 6 months. The patient will be referred to the [place] [id number] for their primary care needs. \"     Copy of no show policy given to patient after verbal explanation of the policy. Pt verb understanding. Toñito Schmitt. Hira RN, MSN, [employer]   [address] [place] [address]      I spent 60 minutes with the patient in face-to-face consultation, of which greater than 50% was spent in counseling and coordination of care as described above.

## 2018-05-02 PROBLEM — Z79.4 TYPE 2 DIABETES MELLITUS WITH HYPERGLYCEMIA, WITH LONG-TERM CURRENT USE OF INSULIN (HCC): Status: ACTIVE | Noted: 2018-05-02

## 2018-05-02 PROBLEM — M25.50 ARTHRALGIA: Status: ACTIVE | Noted: 2018-05-02

## 2018-05-02 PROBLEM — I10 ESSENTIAL HYPERTENSION: Status: ACTIVE | Noted: 2018-05-02

## 2018-05-02 PROBLEM — E11.65 TYPE 2 DIABETES MELLITUS WITH HYPERGLYCEMIA, WITH LONG-TERM CURRENT USE OF INSULIN (HCC): Status: ACTIVE | Noted: 2018-05-02

## 2018-05-02 PROBLEM — R76.8 HEPATITIS C ANTIBODY TEST POSITIVE: Status: ACTIVE | Noted: 2018-05-02

## 2018-05-02 PROBLEM — R80.9 MICROALBUMINURIA: Status: ACTIVE | Noted: 2018-05-02

## 2018-05-02 LAB
C TRACH RRNA SPEC QL NAA+PROBE: NEGATIVE
HIV 1+2 AB+HIV1 P24 AG SERPL QL IA: NONREACTIVE
HIV12 RESULT COMMENT, HHIVC: NORMAL
N GONORRHOEA RRNA SPEC QL NAA+PROBE: NEGATIVE
SPECIMEN SOURCE: NORMAL

## 2018-05-02 NOTE — PROGRESS NOTES
Please review new patient labs with patient at appt: 5/9/18  1) HIV neg  2) RPR neg  3) GC/Chl/Trich awaiting results  4) Hep- Hep C +. Pt needs PCR drawn on 5/9/18  5) A1c 7.5. Cont lantus 60 units daily. Will order Kombiglyze via [facility] as sample and as a program medication. Pt needs to come into the office to sign TPC paperwork and to  samples if available. Janice Lisa will replace metformin. 6) UDS neg  7) EtOH neg  8) Lipids good  9) Thy good  10) CMP AST slightly elevated. Will monitor 3m  11) Mag good  12) CBC WBC slightly low.  Will monitor in 3 months  13) Vit D good  14) FIT awaiting results  15) PSA good  16) Microalbumin elevated- possibly due to elevated A1c  Pt will need labs 1-2 weeks prior to follow up appt on 8/7/18

## 2018-05-04 LAB — HEMOCCULT STL QL IA: NEGATIVE

## 2018-05-09 ENCOUNTER — CLINICAL SUPPORT (OUTPATIENT)
Dept: FAMILY MEDICINE CLINIC | Age: 53
End: 2018-05-09

## 2018-05-09 ENCOUNTER — HOSPITAL ENCOUNTER (OUTPATIENT)
Dept: LAB | Age: 53
Discharge: HOME | End: 2018-05-09

## 2018-05-09 VITALS
SYSTOLIC BLOOD PRESSURE: 150 MMHG | TEMPERATURE: 98.2 F | RESPIRATION RATE: 12 BRPM | DIASTOLIC BLOOD PRESSURE: 94 MMHG | HEART RATE: 88 BPM | OXYGEN SATURATION: 96 %

## 2018-05-09 DIAGNOSIS — Z71.9 HEALTH EDUCATION/COUNSELING: Primary | ICD-10-CM

## 2018-05-09 DIAGNOSIS — R76.8 HEPATITIS C ANTIBODY TEST POSITIVE: ICD-10-CM

## 2018-05-09 DIAGNOSIS — Z01.30 BLOOD PRESSURE CHECK: ICD-10-CM

## 2018-05-09 PROCEDURE — 87902 NFCT AGT GNTYP ALYS HEP C: CPT | Performed by: NURSE PRACTITIONER

## 2018-05-09 PROCEDURE — 87522 HEPATITIS C REVRS TRNSCRPJ: CPT | Performed by: NURSE PRACTITIONER

## 2018-05-09 NOTE — PROGRESS NOTES
Melquiades Hollis is a 46 y.o. male is here for his initial visit with the Nurse Navigator for orientation appointment to learn on how the [facility] works and the services we can provide. We have reviewed [facility]:   Hours of operation and number to contact us. Inclement weather policy     No Show Policy     IF YOU ARE TAKING MEDICINE FOR HIGH BLOOD PRESSURE~ PLEASE TAKE  YOUR 2 HOURS PRIOR TO ANY OFFICE VISIT TO SEE YOUR PROVIDER OR THE   NURSES. Pt has not picked up his lisinopril 20 mg tabs. He states he will get the RX filled this week. He did not think to take two 10 mg tabs to equal new dose. He will RTO in 2 weeks for BP check. ~~~PLEASE BRING ALL MEDICATIONS YOU ARE TAKING TO YOUR VISIT WITH YOUR PROVIDER OR NURSE NAVIGATOR~~~     Lab work (Hours to have lab work drawn). Will be in last week of July to have labs drawn     Medication Policies including times to  med's, number to dial to reach your  pharmacy technician and the paperwork that must be signed to obtain med's. Sent to [facility] to  new medication and sign TPC paperwork. Foot care thru the [facility] foot ministry hours as well as directions     Dental Clinic      Diabetic eye exams     Sick visits     [facility] Program including location at [facility] and phone contact number. PLEASE NOTE THE [facility] CONTACT IS AT THE [facility] TUESDAY MORNINGS FROM 8 AM-1130 AM.YOU MUST SEE [facility] BEFORE BEING REFERRED TO A SPECIALIST! Mental Health appointments with Ms. Johan Patel are scheduled on Mondays and Wednesdays. He has already scheduled an appt for Ms. [address] Number and how to contact AA/NA meetings for help with addictions      We have reviewed Mr. Nancylee Hodgkin lab work today. LISBETH Montez, JUDY                   Please review new patient labs with patient at appt: 5/9/18   1) HIV neg   2) RPR neg   3) GC/Chl/Trich awaiting results   4) Hep- Hep C +. Pt needs PCR drawn on 5/9/18   5) A1c 7.5. Cont lantus 60 units daily. Will order Kombiglyze via [address] as sample and as a program medication. Pt needs to come into the office to sign TPC paperwork and to  samples if available. Valla Elmer will replace metformin. 6) UDS neg   7) EtOH neg   8) Lipids good   9) Thy good   10) CMP AST slightly elevated. Will monitor 3m   11) Mag good   12) CBC WBC slightly low. Will monitor in 3 months   13) Vit D good   14) FIT awaiting results   15) PSA good   16) Microalbumin elevated- possibly due to elevated A1c   Pt will need labs 1-2 weeks prior to follow up appt on 8/7/18                Pt will start Valla Elmer today and was told to stop the Metformin. He is aware he will take the PO medication in addition to the insulin, He will try to cut out concentrated sweets. He will RTO in one week for blood pressure check 2 hours after taking lisinopril 20 mg. Labs were drawn from the right [facility] on first attempt. Tubes labeled per policy and sent to lab. Patient signed a MART so we may obtain records from the [facility] in [place]. Patient states he had the Harvoni work-up done there and was about to start the medication when he moved to this side. Pt also continues to c/o pain in left thumb. He states the Arthrotec is not helping \"at all\". Pt was just seen at Dr. Tigre Lazcano office and had injection of the that thumb. Pain starts at the base of his thumb and shoots up towards tip of finger. Ms. Russell Galicia made aware and pt is asked to F/U with Dr. Cintia Linares.

## 2018-05-13 LAB
HCV GENOTYPE: NORMAL
HCV GENTYP SERPL NAA+PROBE: NORMAL
HCV RNA SERPL NAA+PROBE-ACNC: NORMAL IU/ML
HCV RNA SERPL NAA+PROBE-LOG IU: 6.88 LOG10 IU/ML
PLEASE NOTE, 550474: NORMAL
TEST INFORMATION, 550045: NORMAL

## 2018-05-14 NOTE — PROGRESS NOTES
Jessenia Pt is confirmed to have Hep C with genotype 1a. Pt either needs to obtain his Hep A&B vaccine records from his MD in [place] or he will need to obtain these vaccines again from the health dept. We cant start Harvoni therapy without this being done.  Thank you

## 2018-05-22 ENCOUNTER — TELEPHONE (OUTPATIENT)
Dept: FAMILY MEDICINE CLINIC | Age: 53
End: 2018-05-22

## 2018-05-23 ENCOUNTER — OFFICE VISIT (OUTPATIENT)
Dept: FAMILY MEDICINE CLINIC | Age: 53
End: 2018-05-23

## 2018-05-23 VITALS
RESPIRATION RATE: 16 BRPM | SYSTOLIC BLOOD PRESSURE: 130 MMHG | WEIGHT: 175.4 LBS | DIASTOLIC BLOOD PRESSURE: 88 MMHG | HEIGHT: 67 IN | HEART RATE: 69 BPM | BODY MASS INDEX: 27.53 KG/M2 | OXYGEN SATURATION: 96 % | TEMPERATURE: 98.1 F

## 2018-05-23 VITALS
RESPIRATION RATE: 12 BRPM | OXYGEN SATURATION: 97 % | HEART RATE: 74 BPM | SYSTOLIC BLOOD PRESSURE: 138 MMHG | DIASTOLIC BLOOD PRESSURE: 82 MMHG

## 2018-05-23 DIAGNOSIS — B36.9 FUNGAL INFECTION OF SKIN: Primary | ICD-10-CM

## 2018-05-23 DIAGNOSIS — Z01.30 BLOOD PRESSURE CHECK: Primary | ICD-10-CM

## 2018-05-23 DIAGNOSIS — R21 RASH IN ADULT: ICD-10-CM

## 2018-05-23 DIAGNOSIS — H00.015 HORDEOLUM EXTERNUM OF LEFT LOWER EYELID: ICD-10-CM

## 2018-05-23 RX ORDER — KETOCONAZOLE 20 MG/G
CREAM TOPICAL DAILY
Qty: 15 G | Refills: 0 | Status: SHIPPED | OUTPATIENT
Start: 2018-05-23 | End: 2018-07-23

## 2018-05-23 NOTE — PROGRESS NOTES
Subjective:   Ayanna Galloway is a 46 y.o. male with hypertension. Current Outpatient Prescriptions   Medication Sig Dispense Refill    sAXagliptin-metFORMIN (KOMBIGLYZE XR) 2.5-1,000 mg TM24 Take 1 Tab by mouth daily. For diabetes 90 Tab 0    lisinopril (PRINIVIL) 10 mg tablet Take 2 tabs daily until gone then start Lisinopril 20mg every day. 30 Tab 0    insulin glargine (LANTUS,BASAGLAR) 100 unit/mL (3 mL) inpn Inject 60 units daily 6 Pen 3    sAXagliptin-metFORMIN (KOMBIGLYZE XR) 2.5-1,000 mg TM24 Take 1 Tab by mouth daily. For diabetes 90 Tab 3    lisinopril (PRINIVIL, ZESTRIL) 20 mg tablet Take 1 Tab by mouth daily. 30 Tab 2    diclofenac-miSOPROStol (ARTHROTEC 50)  mg-mcg per tablet Take 1 Tab by mouth two (2) times daily as needed. For pain 90 Tab 3    insulin syringe-needle U-100 40 Units by SubCUTAneous route two (2) times a day. In the AM and  Syringe 11    glucose blood VI test strips (BLOOD GLUCOSE TEST) strip Check blood sugar three times a day prior to insulin administration 100 Strip 0      Hypertension ROS: taking medications as instructed, no medication side effects noted, no TIA's, no chest pain on exertion, no dyspnea on exertion. New concerns: Would like to be seen today for rash on face x 1-2 months. Objective:   Visit Vitals    /82 (BP 1 Location: Right arm, BP Patient Position: Sitting)    Pulse 74    Resp 12    SpO2 97%      Appearance alert, well appearing, and in no distress, oriented to person, place, and time, normal appearing weight, improved and well hydrated. General exam BP noted to be well controlled today in office. Assessment:    Hypertension stable, improved, no significant medication side effects noted. Plan:   current treatment plan is effective, no change in therapy  Pt has had patchy depigmented areas on face \" a month or two\". Pt w/o facial swelling, itching, only + pink areas in raccoon style.  He is enc to make appy w/ provider, avoid direct sun when available and use sunscreen if out in sun which he states he is \"all day\". Alexsandra Harris

## 2018-05-23 NOTE — TELEPHONE ENCOUNTER
Medication: Arthrotec 50/200mg , dose: 1 tab, how often: bid  as needed, current number of medication days provided: 90, refill per application. Lot #:[id number], EXP 08/2021. This medication was received and verified for the following 1. Correct Patient, 2. Correct Diagnosis, 3. Correct Drug, 4. Correct route, and no current allergy to medication. Please contact patient to come  their medications.      Steven Pete, MSN,RN,[facility]

## 2018-05-23 NOTE — PROGRESS NOTES
Rash    The history is provided by the patient. This is a new (I have a fungus on my right foot that I have been taking care of.) problem. Episode onset: comes and goes x a few months. There has been no fever. The rash is present on the face. The pain is at a severity of 0/10. The patient is experiencing no pain. Associated symptoms include itching. Pertinent negatives include no blisters, no pain, no weeping and no hives. He has tried nothing for the symptoms. Chief Complaint   Patient presents with    Rash     SICK VISIT with c/o daniel face x \"couple of months\". He informs was seen in ED 4/18/18 and rx'd prednisone which cleared  rash and now it's back     Visit Vitals    /88 (BP 1 Location: Left arm, BP Patient Position: Sitting)    Pulse 69    Temp 98.1 °F (36.7 °C) (Oral)    Resp 16    Ht 5' 7\" (1.702 m)    Wt 175 lb 6.4 oz (79.6 kg)    SpO2 96%    BMI 27.47 kg/m2     Physical Exam   Constitutional: He appears well-nourished. HENT:   Head: Atraumatic. Red indicates recurring fungal infec. Blue indicates recent rash development    Green indicates stye   Cardiovascular: Normal rate and regular rhythm. Pulmonary/Chest: Effort normal and breath sounds normal.   Musculoskeletal: Normal range of motion. Neurological: He is alert. Skin:            Current Outpatient Prescriptions on File Prior to Visit   Medication Sig Dispense Refill    sAXagliptin-metFORMIN (KOMBIGLYZE XR) 2.5-1,000 mg TM24 Take 1 Tab by mouth daily. For diabetes 90 Tab 0    sAXagliptin-metFORMIN (KOMBIGLYZE XR) 2.5-1,000 mg TM24 Take 1 Tab by mouth daily. For diabetes 90 Tab 3    lisinopril (PRINIVIL, ZESTRIL) 20 mg tablet Take 1 Tab by mouth daily. 30 Tab 2    lisinopril (PRINIVIL) 10 mg tablet Take 2 tabs daily until gone then start Lisinopril 20mg every day.  30 Tab 0    insulin glargine (LANTUS,BASAGLAR) 100 unit/mL (3 mL) inpn Inject 60 units daily 6 Pen 3    diclofenac-miSOPROStol (ARTHROTEC 50)  mg-mcg per tablet Take 1 Tab by mouth two (2) times daily as needed. For pain 90 Tab 3    insulin syringe-needle U-100 40 Units by SubCUTAneous route two (2) times a day. In the AM and  Syringe 11    glucose blood VI test strips (BLOOD GLUCOSE TEST) strip Check blood sugar three times a day prior to insulin administration 100 Strip 0     No current facility-administered medications on file prior to visit. 1. Fungal infection of skin    - ketoconazole (NIZORAL) 2 % topical cream; Apply  to affected area daily. Dispense: 15 g; Refill: 0    2. Hordeolum externum of left lower eyelid  Instructed pt to place a warm compress over area 2-3 times per day.

## 2018-05-30 ENCOUNTER — TELEPHONE (OUTPATIENT)
Dept: FAMILY MEDICINE CLINIC | Age: 53
End: 2018-05-30

## 2018-05-30 NOTE — TELEPHONE ENCOUNTER
Medication: Kombiglyze 2.5/1000, dose: 1 tab, how often: qd , current number of medication days provided: 90, refill per application. Lot #: [id number], EXP 05/2019. This medication was received and verified for the following 1. Correct Patient, 2. Correct Diagnosis, 3. Correct Drug, 4. Correct route, and no current allergy to medication. Please contact patient to come  their medications.      Robinson Patricio MSN,RN,[facility]

## 2018-06-06 ENCOUNTER — OFFICE VISIT (OUTPATIENT)
Dept: FAMILY MEDICINE CLINIC | Age: 53
End: 2018-06-06

## 2018-06-06 VITALS
OXYGEN SATURATION: 97 % | HEART RATE: 71 BPM | SYSTOLIC BLOOD PRESSURE: 130 MMHG | RESPIRATION RATE: 16 BRPM | DIASTOLIC BLOOD PRESSURE: 89 MMHG

## 2018-06-06 DIAGNOSIS — Z01.30 BLOOD PRESSURE CHECK: ICD-10-CM

## 2018-06-06 DIAGNOSIS — Z78.9 KNOWLEDGE DEFICIT ABOUT THERAPEUTIC DIET: ICD-10-CM

## 2018-06-06 DIAGNOSIS — R76.8 HEPATITIS C ANTIBODY TEST POSITIVE: Primary | ICD-10-CM

## 2018-06-06 NOTE — PROGRESS NOTES
Subjective:   Sagrario Jacobson is a 46 y.o. male with hypertension. Current Outpatient Prescriptions   Medication Sig Dispense Refill    ketoconazole (NIZORAL) 2 % topical cream Apply  to affected area daily. 15 g 0    sAXagliptin-metFORMIN (KOMBIGLYZE XR) 2.5-1,000 mg TM24 Take 1 Tab by mouth daily. For diabetes 90 Tab 3    lisinopril (PRINIVIL, ZESTRIL) 20 mg tablet Take 1 Tab by mouth daily. 30 Tab 2    lisinopril (PRINIVIL) 10 mg tablet Take 2 tabs daily until gone then start Lisinopril 20mg every day. 30 Tab 0    insulin glargine (LANTUS,BASAGLAR) 100 unit/mL (3 mL) inpn Inject 60 units daily 6 Pen 3    diclofenac-miSOPROStol (ARTHROTEC 50)  mg-mcg per tablet Take 1 Tab by mouth two (2) times daily as needed. For pain 90 Tab 3    insulin syringe-needle U-100 40 Units by SubCUTAneous route two (2) times a day. In the AM and  Syringe 11    glucose blood VI test strips (BLOOD GLUCOSE TEST) strip Check blood sugar three times a day prior to insulin administration 100 Strip 0      Hypertension ROS: taking medications as instructed, no medication side effects noted, patient does not perform home BP monitoring, no TIA's, no chest pain on exertion, no dyspnea on exertion, no swelling of ankles. New concerns: Salty diet! HEP C positive and waiting on vaccination record from patient. Objective:   Visit Vitals    /89 (BP 1 Location: Right arm, BP Patient Position: Sitting)    Pulse 71    Resp 16    SpO2 97%      Appearance alert, well appearing, and in no distress, oriented to person, place, and time and normal appearing weight. General exam BP noted to be borderline today in office. Assessment:    Hypertension borderline controlled, needs improvement, needs to follow diet more regularly. Plan: We have discussed what the numbers mean when I take a BP. Given a copy of the DASH diet and stressed imp of lowering salty food in diet. PATIENT AWAITING HARVONI TREATMENT.  HE STATES HE IS \" WORKING ON GETTING SHOT RECORD\" FOR HEP VACCINATIONS. ADVISED TO BRING IN ASAP SO WE MAY PROCEED WITH  TREATMENT.  recommended sodium restriction  cardiovascular risk and specific lipid/LDL goals reviewed  specific diabetic recommendations: home glucose monitoring emphasized and long term diabetic complications discussed.

## 2018-06-13 ENCOUNTER — TELEPHONE (OUTPATIENT)
Dept: FAMILY MEDICINE CLINIC | Age: 53
End: 2018-06-13

## 2018-06-14 NOTE — TELEPHONE ENCOUNTER
Medication: Lantus , dose: 60, how often: qd , current number of medication days provided: 90, refill per application. Lot #: [id number], EXP 09/2020. This medication was received and verified for the following 1. Correct Patient, 2. Correct Diagnosis, 3. Correct Drug, 4. Correct route, and no current allergy to medication. Please contact patient to come  their medications.      Valdez Cartagena MSN,RN,[facility]

## 2018-07-23 ENCOUNTER — OFFICE VISIT (OUTPATIENT)
Dept: FAMILY MEDICINE CLINIC | Age: 53
End: 2018-07-23

## 2018-07-23 ENCOUNTER — APPOINTMENT (OUTPATIENT)
Dept: GENERAL RADIOLOGY | Age: 53
End: 2018-07-23
Attending: PHYSICIAN ASSISTANT
Payer: SUBSIDIZED

## 2018-07-23 ENCOUNTER — HOSPITAL ENCOUNTER (EMERGENCY)
Age: 53
Discharge: HOME | End: 2018-07-23
Attending: EMERGENCY MEDICINE
Payer: SUBSIDIZED

## 2018-07-23 VITALS
OXYGEN SATURATION: 99 % | TEMPERATURE: 98 F | BODY MASS INDEX: 28.25 KG/M2 | RESPIRATION RATE: 16 BRPM | HEIGHT: 67 IN | HEART RATE: 63 BPM | DIASTOLIC BLOOD PRESSURE: 97 MMHG | SYSTOLIC BLOOD PRESSURE: 146 MMHG | WEIGHT: 180 LBS

## 2018-07-23 DIAGNOSIS — M79.642 CHRONIC PAIN OF LEFT HAND: ICD-10-CM

## 2018-07-23 DIAGNOSIS — F51.02 INSOMNIA DUE TO STRESS: ICD-10-CM

## 2018-07-23 DIAGNOSIS — F32.A DEPRESSION, UNSPECIFIED DEPRESSION TYPE: Primary | ICD-10-CM

## 2018-07-23 DIAGNOSIS — R21 RASH: Primary | ICD-10-CM

## 2018-07-23 DIAGNOSIS — F43.9 STRESS: ICD-10-CM

## 2018-07-23 DIAGNOSIS — M19.90 ARTHRITIS: ICD-10-CM

## 2018-07-23 DIAGNOSIS — B36.9 FUNGAL INFECTION OF SKIN: ICD-10-CM

## 2018-07-23 DIAGNOSIS — G89.29 CHRONIC PAIN OF LEFT HAND: ICD-10-CM

## 2018-07-23 LAB — GLUCOSE BLD STRIP.AUTO-MCNC: 84 MG/DL (ref 70–110)

## 2018-07-23 PROCEDURE — 82962 GLUCOSE BLOOD TEST: CPT

## 2018-07-23 PROCEDURE — 73130 X-RAY EXAM OF HAND: CPT

## 2018-07-23 PROCEDURE — 99283 EMERGENCY DEPT VISIT LOW MDM: CPT

## 2018-07-23 RX ORDER — KETOCONAZOLE 20 MG/G
CREAM TOPICAL DAILY
Qty: 15 G | Refills: 0 | Status: SHIPPED | OUTPATIENT
Start: 2018-07-23

## 2018-07-23 RX ORDER — FLUOXETINE HYDROCHLORIDE 20 MG/1
20 CAPSULE ORAL DAILY
Qty: 30 CAP | Refills: 3 | Status: SHIPPED | COMMUNITY
Start: 2018-07-23

## 2018-07-23 NOTE — DISCHARGE INSTRUCTIONS
Arthritis: Care Instructions  Your Care Instructions  Arthritis, also called osteoarthritis, is a breakdown of the cartilage that cushions your joints. When the cartilage wears down, your bones rub against each other. This causes pain and stiffness. Many people have some arthritis as they age. Arthritis most often affects the joints of the spine, hands, hips, knees, or feet. You can take simple measures to protect your joints, ease your pain, and help you stay active. Follow-up care is a key part of your treatment and safety. Be sure to make and go to all appointments, and call your doctor if you are having problems. It's also a good idea to know your test results and keep a list of the medicines you take. How can you care for yourself at home? · Stay at a healthy weight. Being overweight puts extra strain on your joints. · Talk to your doctor or physical therapist about exercises that will help ease joint pain. ¨ Stretch. You may enjoy gentle forms of yoga to help keep your joints and muscles flexible. ¨ Walk instead of jog. Other types of exercise that are less stressful on the joints include riding a bicycle, swimming, paulina chi, or water exercise. ¨ Lift weights. Strong muscles help reduce stress on your joints. Stronger thigh muscles, for example, take some of the stress off of the knees and hips. Learn the right way to lift weights so you do not make joint pain worse. · Take your medicines exactly as prescribed. Call your doctor if you think you are having a problem with your medicine. · Take pain medicines exactly as directed. ¨ If the doctor gave you a prescription medicine for pain, take it as prescribed. ¨ If you are not taking a prescription pain medicine, ask your doctor if you can take an over-the-counter medicine. · Use a cane, crutch, walker, or another device if you need help to get around. These can help rest your joints.  You also can use other things to make life easier, such as a higher toilet seat and padded handles on kitchen utensils. · Do not sit in low chairs, which can make it hard to get up. · Put heat or cold on your sore joints as needed. Use whichever helps you most. You also can take turns with hot and cold packs. ¨ Apply heat 2 or 3 times a day for 20 to 30 minutes-using a heating pad, hot shower, or hot pack-to relieve pain and stiffness. ¨ Put ice or a cold pack on your sore joint for 10 to 20 minutes at a time. Put a thin cloth between the ice and your skin. When should you call for help? Call your doctor now or seek immediate medical care if:    · You have sudden swelling, warmth, or pain in any joint.     · You have joint pain and a fever or rash.     · You have such bad pain that you cannot use a joint.    Watch closely for changes in your health, and be sure to contact your doctor if:    · You have mild joint symptoms that continue even with more than 6 weeks of care at home.     · You have stomach pain or other problems with your medicine. Where can you learn more? Go to http://pacoStemCytechavo.info/. Enter P870 in the search box to learn more about \"Arthritis: Care Instructions. \"  Current as of: October 10, 2017  Content Version: 11.7  © 0240-9504 [employer]. Care instructions adapted under license by [employer] (which disclaims liability or warranty for this information). If you have questions about a medical condition or this instruction, always ask your healthcare professional. [place] [address] any warranty or liability for your use of this information. Hand Pain: Care Instructions  Your Care Instructions    Common causes of hand pain are overuse and injuries, such as might happen during sports or home repair projects. Everyday wear and tear, especially as you get older, also can cause hand pain.   Most minor hand injuries will heal on their own, and home treatment is usually all you need to do. If you have sudden and severe pain, you may need tests and treatment. Follow-up care is a key part of your treatment and safety. Be sure to make and go to all appointments, and call your doctor if you are having problems. It's also a good idea to know your test results and keep a list of the medicines you take. How can you care for yourself at home? · Take pain medicines exactly as directed. ¨ If the doctor gave you a prescription medicine for pain, take it as prescribed. ¨ If you are not taking a prescription pain medicine, ask your doctor if you can take an over-the-counter medicine. · Rest and protect your hand. Take a break from any activity that may cause pain. · Put ice or a cold pack on your hand for 10 to 20 minutes at a time. Put a thin cloth between the ice and your skin. · Prop up the sore hand on a pillow when you ice it or anytime you sit or lie down during the next 3 days. Try to keep it above the level of your heart. This will help reduce swelling. · If your doctor recommends a sling, splint, or elastic bandage to support your hand, wear it as directed. When should you call for help? Call 911 anytime you think you may need emergency care. For example, call if:    · Your hand turns cool or pale or changes color.    Call your doctor now or seek immediate medical care if:    · You cannot move your hand.     · Your hand pops, moves out of its normal position, and then returns to its normal position.     · You have signs of infection, such as:  ¨ Increased pain, swelling, warmth, or redness. ¨ Red streaks leading from the sore area. ¨ Pus draining from a place on your hand.   ¨ A fever.     · Your hand feels numb or tingly.    Watch closely for changes in your health, and be sure to contact your doctor if:    · Your hand feels unstable when you try to use it.     · You do not get better as expected.     · You have any new symptoms, such as swelling.     · Bruises from an injury to your hand last longer than 2 weeks. Where can you learn more? Go to http://paco-chavo.info/. Enter R273 in the search box to learn more about \"Hand Pain: Care Instructions. \"  Current as of: November 20, 2017  Content Version: 11.7  © 3431-1718 [employer]. Care instructions adapted under license by [employer] (which disclaims liability or warranty for this information). If you have questions about a medical condition or this instruction, always ask your healthcare professional. [address] any warranty or liability for your use of this information. Rash: Care Instructions  Your Care Instructions  A rash is any irritation or inflammation of the skin. Rashes have many possible causes, including allergy, infection, illness, heat, and emotional stress. Follow-up care is a key part of your treatment and safety. Be sure to make and go to all appointments, and call your doctor if you are having problems. It's also a good idea to know your test results and keep a list of the medicines you take. How can you care for yourself at home? · Wash the area with water only. Soap can make dryness and itching worse. Pat dry. · Put cold, wet cloths on the rash to reduce itching. · Keep cool, and stay out of the sun. · Leave the rash open to the air as much of the time as possible. · Sometimes petroleum jelly (Vaseline) can help relieve the discomfort caused by a rash. A moisturizing lotion, such as Cetaphil, also may help. Calamine lotion may help for rashes caused by contact with something (such as a plant or soap) that irritated the skin. Use it 3 or 4 times a day. · If your doctor prescribed a cream, use it as directed. If your doctor prescribed medicine, take it exactly as directed.   · If your rash itches so badly that it interferes with your normal activities, take an over-the-counter antihistamine, such as diphenhydramine (Benadryl) or loratadine (Claritin). Read and follow all instructions on the label. When should you call for help? Call your doctor now or seek immediate medical care if:    · You have signs of infection, such as:  ¨ Increased pain, swelling, warmth, or redness. ¨ Red streaks leading from the area. ¨ Pus draining from the area. ¨ A fever.     · You have joint pain along with the rash.    Watch closely for changes in your health, and be sure to contact your doctor if:    · Your rash is changing or getting worse. For example, call if you have pain along with the rash, the rash is spreading, or you have new blisters.     · You do not get better after 1 week. Where can you learn more? Go to http://paco-chavo.info/. Enter N714 in the search box to learn more about \"Rash: Care Instructions. \"  Current as of: October 5, 2017  Content Version: 11.7  © 4472-6998 [employer]. Care instructions adapted under license by [employer] (which disclaims liability or warranty for this information). If you have questions about a medical condition or this instruction, always ask your healthcare professional. [place] [address] any warranty or liability for your use of this information. Lendino Activation    Thank you for requesting access to [employer]. Please follow the instructions below to securely access and download your online medical record. Lendino allows you to send messages to your doctor, view your test results, renew your prescriptions, schedule appointments, and more. How Do I Sign Up? 1. In your internet browser, go to [email or web address]  2. Click on the First Time User? Click Here link in the Sign In box. You will be redirect to the New Member Sign Up page. 3. Enter your Lendino Access Code exactly as it appears below. You will not need to use this code after youve completed the sign-up process.  If you do not sign up before the expiration date, you must request a new code.    MaxLinear Access Code: [email or web address]  Expires: 10/21/2018  9:17 AM (This is the date your MaxLinear access code will )    4. Enter the last four digits of your Social Security Number (xxxx) and Date of Birth (mm/dd/yyyy) as indicated and click Submit. You will be taken to the next sign-up page. 5. Create a MaxLinear ID. This will be your MaxLinear login ID and cannot be changed, so think of one that is secure and easy to remember. 6. Create a MaxLinear password. You can change your password at any time. 7. Enter your Password Reset Question and Answer. This can be used at a later time if you forget your password. 8. Enter your e-mail address. You will receive e-mail notification when new information is available in [address] 9. Click Sign Up. You can now view and download portions of your medical record. 10. Click the Download Summary menu link to download a portable copy of your medical information. Additional Information    If you have questions, please visit the Frequently Asked Questions section of the MaxLinear website at [email or web address] Remember, MaxLinear is NOT to be used for urgent needs. For medical emergencies, dial 911.

## 2018-07-23 NOTE — ED TRIAGE NOTES
Pt complaining of tongue tingling, facial rash, and left hand pain. Pt has hx of arthritis. Denies SOB, chest pain. Pt denies SI and HI.

## 2018-07-23 NOTE — PROGRESS NOTES
This is an initial visit for this well developed, well nourished AA male who presents today in a depressed and hopeless state. Patient discloses that he is living in a housing program that is offered by a [employer] organization. 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 Reports being depressed about his living situation, his mother's death last year and not having what he needs to independently care for himself. We discussed at length the premise that nothing lasts forever, that situations change and usually when things that are changeable stop changing they're dead. Patient stated that he's never looked at life that way. We also discussed acceptance usually being the key/solution to our problems. He voiced understanding. Stated his appetite is good and stated he tries to eat \" right\" because he's diabetic. Stated also that he doesn't sleep well. He denied any support mentally or physically from family members. Patient mentioned that he was thinking about moving to [facility] where a [employer] affiliated with his current Rastafari is located and does the same type of ministry. Stated this would be a change of scenery for him and maybe a start to a new life. Patient reported having applied for disability four times with no success. States he will seek the  of an . Patient will receive prescriptions for Prozac 20 mg daily and Trazodone 50 mg at bedtime. He denied suicidal, homicidal ideation or any form of hallucinations and will return in three weeks.

## 2018-07-23 NOTE — MR AVS SNAPSHOT
[address] [facility] [address] 
[phone] Patient: Maria Elena Montalvo MRN: [id number] :1965 Visit Information Date & Time Provider Department Dept. Phone Encounter #  
 2018  9:30 AM Ruth Gaspar NP  [facility] [id number] Your Appointments 2018  9:30 AM  
CONSULT with Ruth Gaspar NP [address] [place] [address] ([facility]) Appt Note: Depression [address] [place] [phone]  
[address] [facility] [address]  
  
    
 2018  9:00 AM  
LAB with [facility], LAB [address] [place] [address] ([facility]) Appt Note: labs [address] [place], [place] [address] [address]  
[address] [facility] [phone]  
  
    
 2018 10:30 AM  
Follow Up with Willi Tinajero NP [address] [place] [address] ([facility]) Appt Note: 3 mth follow up  
 [address] [place], [place] [address] [phone]  
[address] [facility] [address]  
  
    
 2018  1:15 PM  
CONSULT with Ruth Gaspar NP [address] [place] [address] ([facility]) Appt Note: [facility] [id number] [phone] Upcoming Health Maintenance Date Due  
 FOOT EXAM Q1 1975 EYE EXAM RETINAL OR DILATED Q1 1975 Pneumococcal 19-64 Medium Risk (1 of 1 - PPSV23) 1984 DTaP/Tdap/Td series (1 - Tdap) 1986 Influenza Age 5 to Adult 2018 HEMOGLOBIN A1C Q6M 2018 MICROALBUMIN Q1 2019 LIPID PANEL Q1 2019 FOBT Q 1 YEAR AGE 50-75 2019 Allergies as of 2018  Review Complete On: 2018 By: Ruth Gaspar NP No Known Allergies Current Immunizations  Never Reviewed No immunizations on file. Not reviewed this visit Vitals Smoking Status Former Smoker Preferred Pharmacy Pharmacy Name Phone [address] [place] [address]. [phone] Your Updated Medication List  
  
   
This list is accurate as of 7/23/18  9:25 AM.  Always use your most recent med list.  
  
  
  
  
 diclofenac-miSOPROStol  mg-mcg per tablet Commonly known as:  ARTHROTEC 50 Take 1 Tab by mouth two (2) times daily as needed. For pain FLUoxetine 20 mg capsule Commonly known as:  PROzac Take 1 Cap by mouth daily. Indications: ANXIETY WITH DEPRESSION  
  
 glucose blood VI test strips strip Commonly known as:  blood glucose test  
Check blood sugar three times a day prior to insulin administration  
  
 insulin glargine 100 unit/mL (3 mL) Inpn Commonly known as:  Alfie Mixer Inject 60 units daily  
  
 insulin syringe-needle U-100  
40 Units by SubCUTAneous route two (2) times a day. In the AM and PM  
  
 ketoconazole 2 % topical cream  
Commonly known as:  NIZORAL Apply  to affected area daily. * lisinopril 20 mg tablet Commonly known as:  Eddie Bridges Take 1 Tab by mouth daily. * lisinopril 10 mg tablet Commonly known as:  PRINIVIL Take 2 tabs daily until gone then start Lisinopril 20mg every day. sAXagliptin-metFORMIN 2.5-1,000 mg Tm24 Commonly known as:  KOMBIGLYZE XR Take 1 Tab by mouth daily. For diabetes * Notice: This list has 2 medication(s) that are the same as other medications prescribed for you. Read the directions carefully, and ask your doctor or other care provider to review them with you. Introducing [facility]! Hannah Osullivan introduces myParcelDelivery patient portal. Now you can access parts of your medical record, email your doctor's office, and request medication refills online. 1. In your internet browser, go to [email or web address] 2. Click on the First Time User? Click Here link in the Sign In box. You will see the New Member Sign Up page. 3. Enter your Natanael Ulien Access Code exactly as it appears below. You will not need to use this code after youve completed the sign-up process. If you do not sign up before the expiration date, you must request a new code. · Natanael Ulien Access Code: [email or web address] Expires: 10/21/2018  9:17 AM 
 
4. Enter the last four digits of your Social Security Number (xxxx) and Date of Birth (mm/dd/yyyy) as indicated and click Submit. You will be taken to the next sign-up page. 5. Create a Relay Networkt ID. This will be your Natanael Ulien login ID and cannot be changed, so think of one that is secure and easy to remember. 6. Create a Natanael Ulien password. You can change your password at any time. 7. Enter your Password Reset Question and Answer. This can be used at a later time if you forget your password. 8. Enter your e-mail address. You will receive e-mail notification when new information is available in [address] 9. Click Sign Up. You can now view and download portions of your medical record. 10. Click the Download Summary menu link to download a portable copy of your medical information. If you have questions, please visit the Frequently Asked Questions section of the Natanael Ulien website. Remember, Natanael Ulien is NOT to be used for urgent needs. For medical emergencies, dial 911. Now available from your iPhone and Android! Please provide this summary of care documentation to your next provider. Your primary care clinician is listed as Katie Davey. If you have any questions after today's visit, please call [phone].

## 2018-07-23 NOTE — PATIENT INSTRUCTIONS
Depression and Chronic Disease: Care Instructions  Your Care Instructions    A chronic disease is one that you have for a long time. Some chronic diseases can be controlled, but they usually cannot be cured. Depression is common in people with chronic diseases, but it often goes unnoticed. Many people have concerns about seeking treatment for a mental health problem. You may think it's a sign of weakness, or you don't want people to know about it. It's important to overcome these reasons for not seeking treatment. Treating depression or anxiety is good for your health. Follow-up care is a key part of your treatment and safety. Be sure to make and go to all appointments, and call your doctor if you are having problems. It's also a good idea to know your test results and keep a list of the medicines you take. How can you care for yourself at home? Watch for symptoms of depression  The symptoms of depression are often subtle at first. You may think they are caused by your disease rather than depression. Or you may think it is normal to be depressed when you have a chronic disease. If you are depressed you may:  · Feel sad or hopeless. · Feel guilty or worthless. · Not enjoy the things you used to enjoy. · Feel hopeless, as though life is not worth living. · Have trouble thinking or remembering. · Have low energy, and you may not eat or sleep well. · Pull away from others. · Think often about death or killing yourself. (Keep the numbers for these national suicide hotlines: 4-189-342-TALK [1-851.856.7754] and 1-926-GKMCYXJ [1-110.115.7196]. )  Get treatment  By treating your depression, you can feel more hopeful and have more energy. If you feel better, you may take better care of yourself, so your health may improve. · Talk to your doctor if you have any changes in mood during treatment for your disease. · Ask your doctor for help.  Counseling, antidepressant medicine, or a combination of the two can help most people with depression. Often a combination works best. Counseling can also help you cope with having a chronic disease. When should you call for help? Call 911 anytime you think you may need emergency care. For example, call if:    · You feel like hurting yourself or someone else.     · Someone you know has depression and is about to attempt or is attempting suicide.   [facility] your doctor now or seek immediate medical care if:    · You hear voices.     · Someone you know has depression and:  ¨ Starts to give away his or her possessions. ¨ Uses illegal drugs or drinks alcohol heavily. ¨ Talks or writes about death, including writing suicide notes or talking about guns, knives, or pills. ¨ Starts to spend a lot of time alone. ¨ Acts very aggressively or suddenly appears calm.    Watch closely for changes in your health, and be sure to contact your doctor if:    · You do not get better as expected. Where can you learn more? Go to http://pacoComply365chavo.info/. Enter N836 in the search box to learn more about \"Depression and Chronic Disease: Care Instructions. \"  Current as of: December 7, 2017  Content Version: 11.7  © 2458-7493 [employer]. Care instructions adapted under license by [employer] (which disclaims liability or warranty for this information). If you have questions about a medical condition or this instruction, always ask your healthcare professional. [place] [address] any warranty or liability for your use of this information. Insomnia: Care Instructions  Your Care Instructions    Insomnia is the inability to sleep well. It is a common problem for most people at some time. Insomnia may make it hard for you to get to sleep, stay asleep, or sleep as long as you need to. This can make you tired and grouchy during the day. It can also make you forgetful, less effective at work, and unhappy.   Insomnia can be caused by conditions such as depression or anxiety. Pain can also affect your ability to sleep. When these problems are solved, the insomnia usually clears up. But sometimes bad sleep habits can cause insomnia. If insomnia is affecting your work or your enjoyment of life, you can take steps to improve your sleep. Follow-up care is a key part of your treatment and safety. Be sure to make and go to all appointments, and call your doctor if you are having problems. It's also a good idea to know your test results and keep a list of the medicines you take. How can you care for yourself at home? What to avoid  · Do not have drinks with caffeine, such as coffee or black tea, for 8 hours before bed. · Do not smoke or use other types of tobacco near bedtime. Nicotine is a stimulant and can keep you awake. · Avoid drinking alcohol late in the evening, because it can cause you to wake in the middle of the night. · Do not eat a big meal close to bedtime. If you are hungry, eat a light snack. · Do not drink a lot of water close to bedtime, because the need to urinate may wake you up during the night. · Do not read or watch TV in bed. Use the bed only for sleeping and sexual activity. What to try  · Go to bed at the same time every night, and wake up at the same time every morning. Do not take naps during the day. · Keep your bedroom quiet, dark, and cool. · Sleep on a comfortable pillow and mattress. · If watching the clock makes you anxious, turn it facing away from you so you cannot see the time. · If you worry when you lie down, start a worry book. Well before bedtime, write down your worries, and then set the book and your concerns aside. · Try meditation or other relaxation techniques before you go to bed. · If you cannot fall asleep, get up and go to another room until you feel sleepy. Do something relaxing. Repeat your bedtime routine before you go to bed again. · Make your house quiet and calm about an hour before bedtime. Turn down the lights, turn off the TV, log off the computer, and turn down the volume on music. This can help you relax after a busy day. When should you call for help? Watch closely for changes in your health, and be sure to contact your doctor if:    · Your efforts to improve your sleep do not work.     · Your insomnia gets worse.     · You have been feeling down, depressed, or hopeless or have lost interest in things that you usually enjoy. Where can you learn more? Go to http://paco-chavo.info/. Enter P513 in the search box to learn more about \"Insomnia: Care Instructions. \"  Current as of: October 10, 2017  Content Version: 11.7  © 9050-7135 [employer]. Care instructions adapted under license by [employer] (which disclaims liability or warranty for this information). If you have questions about a medical condition or this instruction, always ask your healthcare professional. [address] any warranty or liability for your use of this information. Learning About Stress  What is stress? Stress is what you feel when you have to handle more than you are used to. Stress is a fact of life for most people, and it affects everyone differently. What causes stress for you may not be stressful for someone else. A lot of things can cause stress. You may feel stress when you go on a job interview, take a test, or run a race. This kind of short-term stress is normal and even useful. It can help you if you need to work hard or react quickly. For example, stress can help you finish an important job on time. Stress also can last a long time. Long-term stress is caused by stressful situations or events. Examples of long-term stress include long-term health problems, ongoing problems at work, or conflicts in your family. Long-term stress can harm your health. How does stress affect your health?   When you are stressed, your body responds as though you are in danger. It makes hormones that speed up your heart, make you breathe faster, and give you a burst of energy. This is called the fight-or-flight stress response. If the stress is over quickly, your body goes back to normal and no harm is done. But if stress happens too often or lasts too long, it can have bad effects. Long-term stress can make you more likely to get sick, and it can make symptoms of some diseases worse. If you tense up when you are stressed, you may develop neck, shoulder, or low back pain. Stress is linked to high blood pressure and heart disease. Stress also harms your emotional health. It can make you harrison, tense, or depressed. Your relationships may suffer, and you may not do well at work or school. What can you do to manage stress? How to relax your mind  · Write. It may help to write about things that are bothering you. This helps you find out how much stress you feel and what is causing it. When you know this, you can find better ways to cope. · Let your feelings out. Talk, laugh, cry, and express anger when you need to. Talking with friends, family, a counselor, or a member of the clergy about your feelings is a healthy way to relieve stress. · Do something you enjoy. For example, listen to music or go to a movie. Practice your hobby or do volunteer work. · Meditate. This can help you relax, because you are not worrying about what happened before or what may happen in the future. · Do guided imagery. Imagine yourself in any setting that helps you feel calm. You can use audiotapes, books, or a teacher to guide you. How to relax your body  · Do something active. Exercise or activity can help reduce stress. Walking is a great way to get started. Even everyday activities such as housecleaning or yard work can help. · Do breathing exercises. For example:  ¨ From a standing position, bend forward from the waist with your knees slightly bent.  Let your arms dangle close to the floor. ¨ Breathe in slowly and deeply as you return to a standing position. Roll up slowly and lift your head last.  ¨ Hold your breath for just a few seconds in the standing position. ¨ Breathe out slowly and bend forward from the waist.  · Try yoga or paulina chi. These techniques combine exercise and meditation. You may need some training at first to learn them. What can you do to prevent stress? · Manage your time. This helps you find time to do the things you want and need to do. · Get enough sleep. Your body recovers from the stresses of the day while you are sleeping. · Get support. Your family, friends, and community can make a difference in how you experience stress. Where can you learn more? Go to http://paco-chavo.info/. Enter M195 in the search box to learn more about \"Learning About Stress. \"  Current as of: October 10, 2017  Content Version: 11.7  © 4155-6187 [employer], [employer]. Care instructions adapted under license by [employer] (which disclaims liability or warranty for this information). If you have questions about a medical condition or this instruction, always ask your healthcare professional. [place] [address] any warranty or liability for your use of this information.

## 2018-07-23 NOTE — ED PROVIDER NOTES
EMERGENCY DEPARTMENT HISTORY AND PHYSICAL EXAM    11:21 AM      Date: 7/23/2018  Patient Name: Brenna Sánchez    History of Presenting Illness     Chief Complaint   Patient presents with    Skin Problem    Other     tongue tingling.  Hand Pain         History Provided By: Patient    Chief Complaint: rash  Duration:  Weeks  Timing:  Constant  Location: face  Quality: itching  Associated Symptoms: thumb pain      Additional History (Context): Brenna Sánchez is a 46 y.o. male with a hx of dm, htn, depression who presents due to rash of face for weeks. notes rash itches. denies new lotions detergents or foods. also notes left thumb pain for months. no recent injury or trauma. He was previously told its arthritis. Denies numbness, tingling. and weakness as well as any other concerns. he is a former smoker and drinks. PCP: Tressa Jeans, NP    Current Outpatient Prescriptions   Medication Sig Dispense Refill    ketoconazole (NIZORAL) 2 % topical cream Apply  to affected area daily. 15 g 0    FLUoxetine (PROZAC) 20 mg capsule Take 1 Cap by mouth daily. Indications: ANXIETY WITH DEPRESSION 30 Cap 3    sAXagliptin-metFORMIN (KOMBIGLYZE XR) 2.5-1,000 mg TM24 Take 1 Tab by mouth daily. For diabetes 90 Tab 3    lisinopril (PRINIVIL, ZESTRIL) 20 mg tablet Take 1 Tab by mouth daily. 30 Tab 2    lisinopril (PRINIVIL) 10 mg tablet Take 2 tabs daily until gone then start Lisinopril 20mg every day. 30 Tab 0    insulin glargine (LANTUS,BASAGLAR) 100 unit/mL (3 mL) inpn Inject 60 units daily 6 Pen 3    diclofenac-miSOPROStol (ARTHROTEC 50)  mg-mcg per tablet Take 1 Tab by mouth two (2) times daily as needed. For pain 90 Tab 3    insulin syringe-needle U-100 40 Units by SubCUTAneous route two (2) times a day.  In the AM and  Syringe 11    glucose blood VI test strips (BLOOD GLUCOSE TEST) strip Check blood sugar three times a day prior to insulin administration 100 Strip 0       Past History Past Medical History:  Past Medical History:   Diagnosis Date    Arthralgia 5/2/2018    Arthritis     Knees and left wrist/hand    Closed fracture of left wrist 01/08/2018    Diabetes ([address]     Essential hypertension 5/2/2018    Hepatitis C 05/2018    Genotype 1a    Hypertension     Pneumothorax 06/30/2013    x 2 per patient    Sciatic nerve pain 10/06/2016    Type 2 diabetes mellitus with hyperglycemia, with long-term current use of insulin ([address] 5/2/2018       Past Surgical History:  Past Surgical History:   Procedure Laterality Date    HX OTHER SURGICAL  1970    left thigh 350 stitches     HX OTHER SURGICAL      dog bite that caused nerve damage       Family History:  Family History   Problem Relation Age of Onset    Stroke Mother [de-identified]    Hypertension Mother     Hypertension Father     Diabetes Father     Hypertension Brother     Diabetes Brother     Hypertension Brother     Diabetes Brother     Cancer Brother 62       Social History:  Social History   Substance Use Topics    Smoking status: Former Smoker     Quit date: 2/7/2016    Smokeless tobacco: Never Used    Alcohol use 1.8 oz/week     3 Shots of liquor per week      Comment: Patient notes drinks \"often\" quit 2-3 years ago       Allergies:  No Known Allergies      Review of Systems       Review of Systems   Constitutional: Negative for chills and fever. Respiratory: Negative for shortness of breath. Cardiovascular: Negative for chest pain. Gastrointestinal: Negative for abdominal pain, nausea and vomiting. Musculoskeletal:        Positive for thumb pain   Skin: Positive for wound. Negative for rash. Neurological: Negative for weakness and numbness. All other systems reviewed and are negative.         Physical Exam     Visit Vitals    BP (!) 146/97    Pulse 63    Temp 98 °F (36.7 °C)    Resp 16    Ht 5' 7\" (1.702 m)    Wt 81.6 kg (180 lb)    SpO2 99%    BMI 28.19 kg/m2         Physical Exam   Constitutional: He appears well-developed and well-nourished. No distress. HENT:   Head: Normocephalic and atraumatic. Neck: Normal range of motion. Neck supple. Cardiovascular: Normal rate, regular rhythm and normal heart sounds. Exam reveals no gallop and no friction rub. No murmur heard. . Radial pulse 2+   Pulmonary/Chest: Breath sounds normal. No respiratory distress. He has no wheezes. He has no rales. Musculoskeletal:   Full ROM of the digits   Neurological: He is alert. Skin: Skin is warm. Rash noted. He is not diaphoretic. Small regions of hypopigmentation to the face without swelling or hives, no rash of trunk or extremities    Left hand: MCP joint of the left thumb TTP with no deformity or ecchymosis. Nursing note and vitals reviewed. Diagnostic Study Results     Labs -  Recent Results (from the past 12 hour(s))   GLUCOSE, POC    Collection Time: 07/23/18 10:29 AM   Result Value Ref Range    Glucose (POC) 84 70 - 110 mg/dL       Radiologic Studies -   XR HAND LT MIN 3 V   Impression:  1.  No acute fracture. Degenerative changes as above. Medical Decision Making   I am the first provider for this patient. I reviewed the vital signs, available nursing notes, past medical history, past surgical history, family history and social history. Vital Signs-Reviewed the patient's vital signs. Records Reviewed: Nursing Notes (Time of Review: 11:21 AM)    ED Course: Progress Notes, Reevaluation, and Consults:  11:59 AM Reviewed results with patient. Discussed need for close outpatient follow-up. Discussed strict return precautions for any new, worsening, or persistent symptoms. Provider Notes (Medical Decision Making): 46 y.o. Male who presents due to rash of the face that seems consistent with tinea versicolor. No signs of infection or hives. Will prescribe Ketoconazole. Also with chronic left thumb pain. No recent injury/trauma. No evidence of fracture.  He has a hx of arthritis, has wrist immobilizer at home. Stable for d/c with outpatient follow-up       Diagnosis     Clinical Impression:   1. Rash    2. Chronic pain of left hand    3. Arthritis    4. Fungal infection of skin        Disposition: HOME    Follow-up Information     Follow up With Details Comments Contact Info    [facility] EMERGENCY DEPT  If symptoms worsen [address] Lira NP In 2 days  [address] [facility] [address]  [phone]             Patient's Medications   Start Taking    No medications on file   Continue Taking    DICLOFENAC-MISOPROSTOL (ARTHROTEC 50)  MG-MCG PER TABLET    Take 1 Tab by mouth two (2) times daily as needed. For pain    FLUOXETINE (PROZAC) 20 MG CAPSULE    Take 1 Cap by mouth daily. Indications: ANXIETY WITH DEPRESSION    GLUCOSE BLOOD VI TEST STRIPS (BLOOD GLUCOSE TEST) STRIP    Check blood sugar three times a day prior to insulin administration    INSULIN GLARGINE (LANTUS,BASAGLAR) 100 UNIT/ML (3 ML) INPN    Inject 60 units daily    INSULIN SYRINGE-NEEDLE U-100    40 Units by SubCUTAneous route two (2) times a day. In the AM and PM    LISINOPRIL (PRINIVIL) 10 MG TABLET    Take 2 tabs daily until gone then start Lisinopril 20mg every day. LISINOPRIL (PRINIVIL, ZESTRIL) 20 MG TABLET    Take 1 Tab by mouth daily. SAXAGLIPTIN-METFORMIN (KOMBIGLYZE XR) 2.5-1,000 MG TM24    Take 1 Tab by mouth daily. For diabetes   These Medications have changed    Modified Medication Previous Medication    KETOCONAZOLE (NIZORAL) 2 % TOPICAL CREAM ketoconazole (NIZORAL) 2 % topical cream       Apply  to affected area daily. Apply  to affected area daily.    Stop Taking    No medications on file     _______________________________    Attestations:  [address] acting as a scribe for and in the presence of Carissa Dominguez PA-C        July 23, 2018 at 11:22 AM       Provider Attestation:      I personally performed the services described in the documentation, reviewed the documentation, as recorded by the scribe in my presence, and it accurately and completely records my words and actions.  July 23, 2018 at [address], RODY BRAVO

## 2018-08-01 ENCOUNTER — HOSPITAL ENCOUNTER (OUTPATIENT)
Dept: LAB | Age: 53
Discharge: HOME | End: 2018-08-01

## 2018-08-01 ENCOUNTER — TELEPHONE (OUTPATIENT)
Dept: FAMILY MEDICINE CLINIC | Age: 53
End: 2018-08-01

## 2018-08-01 ENCOUNTER — LAB ONLY (OUTPATIENT)
Dept: FAMILY MEDICINE CLINIC | Age: 53
End: 2018-08-01

## 2018-08-01 DIAGNOSIS — Z13.9 SCREENING PROCEDURE: ICD-10-CM

## 2018-08-01 DIAGNOSIS — I10 ESSENTIAL HYPERTENSION: Primary | ICD-10-CM

## 2018-08-01 DIAGNOSIS — E11.65 TYPE 2 DIABETES MELLITUS WITH HYPERGLYCEMIA, WITH LONG-TERM CURRENT USE OF INSULIN (HCC): ICD-10-CM

## 2018-08-01 DIAGNOSIS — Z79.4 TYPE 2 DIABETES MELLITUS WITH HYPERGLYCEMIA, WITH LONG-TERM CURRENT USE OF INSULIN (HCC): ICD-10-CM

## 2018-08-01 DIAGNOSIS — I10 ESSENTIAL HYPERTENSION: ICD-10-CM

## 2018-08-01 LAB
ALBUMIN SERPL-MCNC: 3.6 G/DL (ref 3.4–5)
ALBUMIN/GLOB SERPL: 1.1 {RATIO} (ref 0.8–1.7)
ALP SERPL-CCNC: 83 U/L (ref 45–117)
ALT SERPL-CCNC: 64 U/L (ref 16–61)
ANION GAP SERPL CALC-SCNC: 7 MMOL/L (ref 3–18)
AST SERPL-CCNC: 69 U/L (ref 15–37)
BASOPHILS # BLD: 0.1 K/UL (ref 0–0.1)
BASOPHILS NFR BLD: 1 % (ref 0–2)
BILIRUB SERPL-MCNC: 0.5 MG/DL (ref 0.2–1)
BUN SERPL-MCNC: 11 MG/DL (ref 7–18)
BUN/CREAT SERPL: 11 (ref 12–20)
CALCIUM SERPL-MCNC: 9 MG/DL (ref 8.5–10.1)
CHLORIDE SERPL-SCNC: 104 MMOL/L (ref 100–108)
CO2 SERPL-SCNC: 29 MMOL/L (ref 21–32)
CREAT SERPL-MCNC: 0.99 MG/DL (ref 0.6–1.3)
DIFFERENTIAL METHOD BLD: ABNORMAL
EOSINOPHIL # BLD: 0.3 K/UL (ref 0–0.4)
EOSINOPHIL NFR BLD: 6 % (ref 0–5)
ERYTHROCYTE [DISTWIDTH] IN BLOOD BY AUTOMATED COUNT: 13 % (ref 11.6–14.5)
EST. AVERAGE GLUCOSE BLD GHB EST-MCNC: 128 MG/DL
GLOBULIN SER CALC-MCNC: 3.4 G/DL (ref 2–4)
GLUCOSE SERPL-MCNC: 109 MG/DL (ref 74–99)
HBA1C MFR BLD: 6.1 % (ref 4.2–5.6)
HCT VFR BLD AUTO: 45.4 % (ref 36–48)
HGB BLD-MCNC: 15.2 G/DL (ref 13–16)
LYMPHOCYTES # BLD: 2.1 K/UL (ref 0.9–3.6)
LYMPHOCYTES NFR BLD: 39 % (ref 21–52)
MCH RBC QN AUTO: 28.1 PG (ref 24–34)
MCHC RBC AUTO-ENTMCNC: 33.5 G/DL (ref 31–37)
MCV RBC AUTO: 84.1 FL (ref 74–97)
MONOCYTES # BLD: 0.6 K/UL (ref 0.05–1.2)
MONOCYTES NFR BLD: 11 % (ref 3–10)
NEUTS SEG # BLD: 2.4 K/UL (ref 1.8–8)
NEUTS SEG NFR BLD: 43 % (ref 40–73)
PLATELET # BLD AUTO: 351 K/UL (ref 135–420)
PMV BLD AUTO: 10.3 FL (ref 9.2–11.8)
POTASSIUM SERPL-SCNC: 4 MMOL/L (ref 3.5–5.5)
PROT SERPL-MCNC: 7 G/DL (ref 6.4–8.2)
RBC # BLD AUTO: 5.4 M/UL (ref 4.7–5.5)
SODIUM SERPL-SCNC: 140 MMOL/L (ref 136–145)
WBC # BLD AUTO: 5.5 K/UL (ref 4.6–13.2)

## 2018-08-01 PROCEDURE — 80053 COMPREHEN METABOLIC PANEL: CPT | Performed by: NURSE PRACTITIONER

## 2018-08-01 PROCEDURE — 85025 COMPLETE CBC W/AUTO DIFF WBC: CPT | Performed by: NURSE PRACTITIONER

## 2018-08-01 PROCEDURE — 83036 HEMOGLOBIN GLYCOSYLATED A1C: CPT | Performed by: NURSE PRACTITIONER

## 2018-08-07 ENCOUNTER — OFFICE VISIT (OUTPATIENT)
Dept: FAMILY MEDICINE CLINIC | Age: 53
End: 2018-08-07

## 2018-08-07 VITALS
HEART RATE: 72 BPM | OXYGEN SATURATION: 95 % | RESPIRATION RATE: 16 BRPM | BODY MASS INDEX: 27.78 KG/M2 | TEMPERATURE: 98.3 F | WEIGHT: 177 LBS | SYSTOLIC BLOOD PRESSURE: 138 MMHG | DIASTOLIC BLOOD PRESSURE: 89 MMHG | HEIGHT: 67 IN

## 2018-08-07 DIAGNOSIS — E11.65 TYPE 2 DIABETES MELLITUS WITH HYPERGLYCEMIA, WITH LONG-TERM CURRENT USE OF INSULIN (HCC): ICD-10-CM

## 2018-08-07 DIAGNOSIS — Z79.4 TYPE 2 DIABETES MELLITUS WITH HYPERGLYCEMIA, WITH LONG-TERM CURRENT USE OF INSULIN (HCC): ICD-10-CM

## 2018-08-07 DIAGNOSIS — M25.50 ARTHRALGIA, UNSPECIFIED JOINT: ICD-10-CM

## 2018-08-07 DIAGNOSIS — I10 ESSENTIAL HYPERTENSION: Primary | ICD-10-CM

## 2018-08-07 RX ORDER — LISINOPRIL 20 MG/1
20 TABLET ORAL DAILY
Qty: 30 TAB | Refills: 3 | Status: SHIPPED | OUTPATIENT
Start: 2018-08-07

## 2018-08-07 NOTE — MR AVS SNAPSHOT
[address] [facility] [id number] [phone] Patient: Sharyn Painting MRN: [id number] :1965 Visit Information Date & Time Provider Department Dept. Phone Encounter #  
 2018 10:30 AM Elvira Dumont NP  [facility] [id number] Follow-up Instructions Return in about 3 months (around 2018). Your Appointments 2018 10:30 AM  
Follow Up with Elvira Dumont NP [address] [place] [address] ([facility] Appt Note: 3 mth follow up  
 [address] [facility] [phone]  
[address] [facility] [address]  
  
    
 2018  1:15 PM  
CONSULT with Marisela Busch NP [address] [place] [address] ([facility]) Appt Note: Depression [address] [facility] [phone]  
[address] [facility] [address]  
  
    
 2018  9:30 AM  
Follow Up with Elvira Dumont NP [address] [place] [address] ([facility]) Appt Note: 3 mth follow up  
 [address] [facility] [id number] [phone] Upcoming Health Maintenance Date Due  
 FOOT EXAM Q1 1975 EYE EXAM RETINAL OR DILATED Q1 1975 Pneumococcal 19-64 Medium Risk (1 of 1 - PPSV23) 1984 DTaP/Tdap/Td series (1 - Tdap) 1986 Influenza Age 5 to Adult 2018 HEMOGLOBIN A1C Q6M 2019 MICROALBUMIN Q1 2019 LIPID PANEL Q1 2019 FOBT Q 1 YEAR AGE 50-75 2019 Allergies as of 2018  Review Complete On: 2018 By: Vy Hayden No Known Allergies Current Immunizations  Never Reviewed No immunizations on file. Not reviewed this visit You Were Diagnosed With   
  
 Codes Comments  Essential hypertension     ICD-10-CM: I10 
 ICD-9-CM: 401.9 Vitals BP Pulse Temp Resp Height(growth percentile) Weight(growth percentile) 138/89 72 98.3 °F (36.8 °C) 16 5' 7\" (1.702 m) 177 lb (80.3 kg) SpO2 BMI Smoking Status 95% 27.72 kg/m2 Former Smoker BMI and BSA Data Body Mass Index Body Surface Area  
 27.72 kg/m 2 1.95 m 2 Preferred Pharmacy Pharmacy Name Phone [address] [place] [address]. [phone] Your Updated Medication List  
  
   
This list is accurate as of 8/7/18  8:57 AM.  Always use your most recent med list.  
  
  
  
  
 diclofenac-miSOPROStol  mg-mcg per tablet Commonly known as:  ARTHROTEC 50 Take 1 Tab by mouth two (2) times daily as needed. For pain FLUoxetine 20 mg capsule Commonly known as:  PROzac Take 1 Cap by mouth daily. Indications: ANXIETY WITH DEPRESSION  
  
 glucose blood VI test strips strip Commonly known as:  blood glucose test  
Check blood sugar three times a day prior to insulin administration  
  
 insulin glargine 100 unit/mL (3 mL) Inpn Commonly known as:  Nkechi Scott Inject 60 units daily  
  
 insulin syringe-needle U-100  
40 Units by SubCUTAneous route two (2) times a day. In the AM and PM  
  
 ketoconazole 2 % topical cream  
Commonly known as:  NIZORAL Apply  to affected area daily. lisinopril 20 mg tablet Commonly known as:  Mollie Ripa Take 1 Tab by mouth daily. For blood pressure sAXagliptin-metFORMIN 2.5-1,000 mg Tm24 Commonly known as:  KOMBIGLYZE XR Take 1 Tab by mouth daily. For diabetes Prescriptions Sent to Pharmacy Refills  
 lisinopril (PRINIVIL, ZESTRIL) 20 mg tablet 3 Sig: Take 1 Tab by mouth daily. For blood pressure Class: Normal  
 Pharmacy: [address]  #: [phone] Route: Oral  
  
Follow-up Instructions Return in about 3 months (around 11/7/2018). Patient Instructions [facility] reminders! [facility] Operating Hours: These may change without notice. Mon- Wed 7am to 5pm. Closed for lunch 12-1pm 
Thurs 7am to 12pm 
Fridays closed Office number:  **In case of inclement weather (snow and/or ice) please do not try to come into the office for your appointment. Please call in and you will not be counted as a \"No Show. \" SAFETY FIRST!!** 
 
NO SHOW POLICY ~ If a patient has 3 no shows for an appointment with their Provider, Mental Health Provider, or the Nurse Navigator, they will be discharged from the practice for 6 months. Medication ordering will also be suspended. If the patient is discharged from the [facility], they can go to the [place] [address] or [address] [place] [address] where they can be seen for their primary care needs plus obtain the same type of medications as they received at the [facility]. To avoid being discharged the patient must call the office at [phone] 24 hours prior to their appointment if they need to cancel or reschedule, arrive to their appointments on time (preferrably 15 minutes early) (If you are late you will not be seen) and come to all scheduled appointments with the provider, mental health, and/or nurse navigator. If the patient is discharged from the practice, they can apply to be re-established after 6 months. Lab work:   
Unless you are instructed differently, please return to the office between the hours of 7 am and 11:00 am Monday through Thursday to have your labs drawn one to two weeks before your next scheduled PROVIDER appointment. If you do not have an appointment to follow up on these results, please make one or plan to call the office if you do not hear from us to get the results. No news does not mean good news.    
 
Medications:  
Medication  times are firm: 
Mondays and Tuesdays from 1pm-5pm 
Wednesdays and Thursdays from 8am-11:30am 
 These hours are subject to change without notice. The [facility] or [facility] will assist you with your medications when available as not all medications can be obtained through this program. Medications are added and deleted from the [address] as deemed necessary by the pharmaceutical companies. There is a chance that a medication you currently receive from [facility] may be discontinued. If this occurs an alternative medication will be sent to a local pharmacy for you to obtain and pay for. If your medications are new or have changed, and you get your medications from the [facility] [address] [place] [address]), you MUST talk to the pharmacy staff to sign the new prescription applications. If you don't sign the applications we cannot get the medications for you. It usually takes 6-8 weeks for your medications to arrive. The Pharmacy staff will call you when your medications are available. If you don't hear from them you call [phone] and inquire about your medicines. You are responsible for obtaining your medications. You will have 30 days to come in and  your medications. If you don't  your medicines within those 30 days, those medicines may be placed on the self as samples and you will have to start all over again by completing the applications and waiting the 6-8 weeks for your medicines to arrive. Foot Care: Local [facility] through [facility] ([address]) Every second Tuesday of the month (except for holidays and election days) from 9am to 1 pm. The services provided by these ministry volunteers are free of charge with the option to donate. They will inspect your feet thoroughly, soak them for 10 minutes, cut and file your nails. They care for diabetics as well. Keep in mind this service is free and will be on a first come first serve basis. Bad teeth?   
Ask about the Dental Clinic to get you in front of a local dentist when a dentist is available. They only extract teeth. No fillings, root canals, or dentures. The bus leaves the second Thursday of the month for those on the list. (Ask about availability as these appointments are limited). If you are scheduled for the dentist and you fail to come into the Foundation to meet the bus, you WILL NOT be placed on the dental list again. IMPORTANT: if you have high blood pressure please take your blood pressure  medications before arriving to the Foundation. If your blood pressure is elevated  the dental clinic WILL NOT extract any teeth and you will not have another  opportunity to go to the clinic. DIABETES:  
Do you have uncontrolled diabetes or you just want to learn more about your diabetes? Schedule with the Nurse navigator for our new 5-week Diabetes program. You will learn how to properly manage your diabetes: nutrition, exercise, medication therapy. Eye exams for Diabetics. Please let us know so we can add you to the list to see an eye doctor. These  appointments are limited. You will receive a free eye exam and free glasses if  needed. Unfortunately, if you are not a diabetic, we do not have a free service  for eye exams for you (yet!). We do have information on where to  go to get a  huge discount on eye exams and glasses. Sick visits: If you are sick and it is not an emergency call the office to schedule an appointment to see the provider. Care in the office is FREE but charges will be applied if you go to the Emergency room. If you feel better and do not wish to attend your sick appointment please call  the office and cancel to avoid a no-show. Charges and cost items from the Foundation: Most of our orders are covered by Jeff Lieberman but there ARE SOME CHARGES for items such as radiology interpretations and anesthesiology during procedures and surgeries that are not covered under Hilario Osorio  
 Please make sure you have contacted [facility] to check on your payment options:  
1 [phone] Mon - Fri 8-4pm. It is important that you are screened in order to qualify for assistance and to avoid huge medical charges. This service is to benefit you. The [facility] is not responsible for ANY charges you may accrue regardless of who ordered the medication, procedure, treatment or test. If you go to the Emergency Room, you WILL be charged! Behavior and emotional issues! It is stressful to be sick, have an illness, take medications, not have a job, not have medical insurance, have family issues or just getting older! Schedule an appointment with our mental health provider. She is in the office Mondays and Wednesdays from 8am to [place] [address] can also contact the following: The national suicide hotline (5-080-434-VNQR or 8-305.710.9479) [address] [facility] [address] [place] [address], [address] [place] [address] 
[phone] Community Services Board (CSB) - [place] [address] [facility], [address] [place] [address]  
[phone] Drug and Alcohol Addiction Issues! It is hard to stop a poor habit but there is help out there. Please feel free to attend any of the following support groups to help you kick the habit or go to [facility] to be evaluated by the psychiatric team. Never give up!! Matthew meetings: [facility] MONDAY 10:30 AM [address] [place] [address] [address] [facility] SATURDAY 8:00 PM [facility] SATURDAY NIGHT EDILBERTO NEGRON [address] BITS: 
Disposing medicines in household trash: Almost all medicines can be thrown into your household trash. These include prescription and over-the-counter (OTC) drugs in pills, liquids, drops, patches, creams, and inhalers.  
 
Follow these steps: 
1) Remove the drugs from their original containers and mix them with something undesirable, such as used coffee grounds, dirt, or cat litter. This makes the medicine less appealing to children and pets and unrecognizable to someone who might intentionally go through the trash looking for drugs. 2) Put the mixture in something you can close (a re-sealable zipper storage bag, empty can, or other container) to prevent the drug from leaking or spilling out. 3) Throw the container in the garbage. 4) Scratch out all your personal information on the empty medicine packaging to protect your identity and privacy. Throw the packaging away. If you have a question about your medicine, ask your health care provider or pharmacist. 
 
 
 
  
Introducing [facility]! Yazmin Hayward introduces GR[employer].PS patient portal. Now you can access parts of your medical record, email your doctor's office, and request medication refills online. 1. In your internet browser, go to [email or web address] 2. Click on the First Time User? Click Here link in the Sign In box. You will see the New Member Sign Up page. 3. Enter your GR[employer].PS Access Code exactly as it appears below. You will not need to use this code after youve completed the sign-up process. If you do not sign up before the expiration date, you must request a new code. · GROU.PS Access Code: [email or web address] Expires: 10/21/2018  9:17 AM 
 
4. Enter the last four digits of your Social Security Number (xxxx) and Date of Birth (mm/dd/yyyy) as indicated and click Submit. You will be taken to the next sign-up page. 5. Create a Vertical Point Solution[employer] ID. This will be your [employer] login ID and cannot be changed, so think of one that is secure and easy to remember. 6. Create a [employer] password. You can change your password at any time. 7. Enter your Password Reset Question and Answer. This can be used at a later time if you forget your password. 8. Enter your e-mail address.  You will receive e-mail notification when new information is available in My Point...Exactly. 9. Click Sign Up. You can now view and download portions of your medical record. 10. Click the Download Summary menu link to download a portable copy of your medical information. If you have questions, please visit the Frequently Asked Questions section of the My Point...Exactly website. Remember, My Point...Exactly is NOT to be used for urgent needs. For medical emergencies, dial 911. Now available from your iPhone and Android! Please provide this summary of care documentation to your next provider. Your primary care clinician is listed as Italia Tellez. If you have any questions after today's visit, please call [phone].

## 2018-08-07 NOTE — PROGRESS NOTES
[facility]  [address] [facility], [address]  [phone] [facility]/[phone] fax      8/7/2018    Reason for visit:   Chief Complaint   Patient presents with    Follow Up Chronic Condition       Patient: Romario Collazo, 1965, [id number]       Primary MD: Dina Smith NP    Subjective:   Romario Collazo, a 48 y.o. male, who presents for Follow Up Chronic Condition      Past Medical History:   Diagnosis Date    Arthralgia 5/2/2018    Arthritis     Knees and left wrist/hand    Closed fracture of left wrist 01/08/2018    Diabetes ([facility]     Essential hypertension 5/2/2018    Hepatitis C 05/2018    Genotype 1a    Hypertension     Pneumothorax 06/30/2013    x 2 per patient    Sciatic nerve pain 10/06/2016    Type 2 diabetes mellitus with hyperglycemia, with long-term current use of insulin ([facility] 5/2/2018       Past Surgical History:   Procedure Laterality Date    HX OTHER SURGICAL  1970    left thigh 350 stitches     HX OTHER SURGICAL      dog bite that caused nerve damage       Social History     Social History    Marital status: SINGLE     Spouse name: N/A    Number of children: 2    Years of education: ged     Occupational History    unemployed      Social History Main Topics    Smoking status: Former Smoker     Quit date: 2/7/2016    Smokeless tobacco: Never Used    Alcohol use 1.8 oz/week     3 Shots of liquor per week      Comment: Patient notes drinks \"often\" quit 2-3 years ago    Drug use: Yes     Special: Cocaine, Marijuana      Comment: quit 2-3 years ago    Sexual activity: Yes     Partners: Female     Other Topics Concern     Service No     [employer]    Blood Transfusions No    Caffeine Concern Yes    Occupational Exposure No    Hobby Hazards No    Sleep Concern Yes    Stress Concern Yes    Weight Concern No    Special Diet No    Back Care Yes    Exercise No    Bike Helmet No    Seat Belt Yes     Social History Narrative       No Known Allergies    Current Outpatient Prescriptions on File Prior to Visit   Medication Sig Dispense Refill    FLUoxetine (PROZAC) 20 mg capsule Take 1 Cap by mouth daily. Indications: ANXIETY WITH DEPRESSION 30 Cap 3    ketoconazole (NIZORAL) 2 % topical cream Apply  to affected area daily. 15 g 0    sAXagliptin-metFORMIN (KOMBIGLYZE XR) 2.5-1,000 mg TM24 Take 1 Tab by mouth daily. For diabetes 90 Tab 3    insulin glargine (LANTUS,BASAGLAR) 100 unit/mL (3 mL) inpn Inject 60 units daily 6 Pen 3    diclofenac-miSOPROStol (ARTHROTEC 50)  mg-mcg per tablet Take 1 Tab by mouth two (2) times daily as needed. For pain 90 Tab 3    insulin syringe-needle U-100 40 Units by SubCUTAneous route two (2) times a day. In the AM and  Syringe 11    glucose blood VI test strips (BLOOD GLUCOSE TEST) strip Check blood sugar three times a day prior to insulin administration 100 Strip 0     No current facility-administered medications on file prior to visit. Review of Systems   Constitutional:        I am going to [facility] 8/22/18 for 2-3 months to take care of some family business. I will be back. I will keep yall posted. HENT: Negative. Eyes: Negative. Respiratory: Negative. Cardiovascular: Negative. Gastrointestinal: Negative. Endocrine:        BS in am ; BS in pm 130-230. I have not been in the 300's. I have been sneaking a soda at times. Taking 60 units once a day. Sometimes it is 50 units so I dont run out of insulin. Genitourinary: Negative. Musculoskeletal: Positive for arthralgias (knees and left hand. ). Arthrotec works ok but not that great. I saw the specialist and he said I have arthritis but I feel like it is more than that. I will sit here and a pain would just shoot in it. Dr Davin Lawrence gave me an injection for the pain but it didn't work.  When I was in [place] the doc there didn't know what he was doing but he gave me an injection in my left hand thumb area. Every since I have had pain in that area. I cant pay for pain medication. Skin: Negative. Allergic/Immunologic: Negative. Neurological: Negative. Hematological: Negative. Psychiatric/Behavioral: Negative. Objective:   Visit Vitals    /89    Pulse 72    Temp 98.3 °F (36.8 °C)    Resp 16    Ht 5' 7\" (1.702 m)    Wt 177 lb (80.3 kg)    SpO2 95%    BMI 27.72 kg/m2      Wt Readings from Last 3 Encounters:   08/07/18 177 lb (80.3 kg)   07/23/18 180 lb (81.6 kg)   05/23/18 175 lb 6.4 oz (79.6 kg)     Lab Results   Component Value Date/Time    Glucose 109 (H) 08/01/2018 08:47 AM    Glucose (POC) 84 07/23/2018 10:29 AM         Physical Exam   Constitutional: He appears well-nourished. HENT:   Head: Atraumatic. Neck: Neck supple. Cardiovascular: Normal rate, regular rhythm and normal heart sounds. Pulmonary/Chest: Effort normal and breath sounds normal.   Musculoskeletal: Normal range of motion. Neurological: He is alert. Skin: Skin is warm. Psychiatric: He has a normal mood and affect. Assessment:    Brenna Sánchez who has risk factors including (see above previous medical hx) and:     1. Essential hypertension  Reorder    - lisinopril (PRINIVIL, ZESTRIL) 20 mg tablet; Take 1 Tab by mouth daily. For blood pressure  Dispense: 30 Tab; Refill: 3    2. Type 2 diabetes mellitus with hyperglycemia, with long-term current use of insulin ([facility])  No chg in therapy at this time. Cont 60 units every day. 3. Arthralgia, unspecified joint  Instructed pt on therapeutic interventions to try at home. Apply ice/heat 3 times a day to help relieve pain. Do not take any NSAIDs such as Motrin, Ibuprofen, Aleve or BC powder while taking this NSAID. Pt refused Mobic due to cost and has chose to cont arthrotec. Written instructions followed our verbal discussion of all information discussed above, pending tests ordered and future goals/plans.  Patient expressed understanding of current diagnosis, planned testing, follow up and if needed to contact the office for any questions or concerns prior to the next visit. Plan:   Med reconciliation completed with patient. Reviewed side effects of medications with the patient. Questions were answered and patient verb understanding. Discussed lab results with patient  1) A1c 7.5 to 6.1    Orders Placed This Encounter    HEMOGLOBIN A1C WITH EAG     Standing Status:   Future     Standing Expiration Date:   12/31/2018    lisinopril (PRINIVIL, ZESTRIL) 20 mg tablet     Sig: Take 1 Tab by mouth daily. For blood pressure     Dispense:  30 Tab     Refill:  3     Current Outpatient Prescriptions   Medication Sig Dispense Refill    lisinopril (PRINIVIL, ZESTRIL) 20 mg tablet Take 1 Tab by mouth daily. For blood pressure 30 Tab 3    FLUoxetine (PROZAC) 20 mg capsule Take 1 Cap by mouth daily. Indications: ANXIETY WITH DEPRESSION 30 Cap 3    ketoconazole (NIZORAL) 2 % topical cream Apply  to affected area daily. 15 g 0    sAXagliptin-metFORMIN (KOMBIGLYZE XR) 2.5-1,000 mg TM24 Take 1 Tab by mouth daily. For diabetes 90 Tab 3    insulin glargine (LANTUS,BASAGLAR) 100 unit/mL (3 mL) inpn Inject 60 units daily 6 Pen 3    diclofenac-miSOPROStol (ARTHROTEC 50)  mg-mcg per tablet Take 1 Tab by mouth two (2) times daily as needed. For pain 90 Tab 3    insulin syringe-needle U-100 40 Units by SubCUTAneous route two (2) times a day. In the AM and  Syringe 11    glucose blood VI test strips (BLOOD GLUCOSE TEST) strip Check blood sugar three times a day prior to insulin administration 100 Strip 0       Follow-up Disposition:  Return in about 3 months (around 11/7/2018). labs needed for 3 month follow-up appt  A1c    Galina Iniguez, RN, MSN, AGNP-C   [address] spent 30 minutes with the patient in face-to-face consultation, of which greater than 50% was spent in counseling and coordination of care as described above.

## 2018-08-07 NOTE — PATIENT INSTRUCTIONS
[facility] reminders! Foundation Operating Hours: These may change without notice. Mon- Wed 7am to 5pm. Closed for lunch 12-1pm  Thurs 7am to 12pm  Fridays closed     Office number:     **In case of inclement weather (snow and/or ice) please do not try to come into the office for your appointment. Please call in and you will not be counted as a \"No Show. \" SAFETY FIRST!!**    NO SHOW POLICY ~ If a patient has 3 no shows for an appointment with their Provider, Mental Health Provider, or the Nurse Navigator, they will be discharged from the practice for 6 months. Medication ordering will also be suspended. If the patient is discharged from the [facility], they can go to the [place] [address] or [address] [place] [address] where they can be seen for their primary care needs plus obtain the same type of medications as they received at the [facility]. To avoid being discharged the patient must call the office at [phone] 24 hours prior to their appointment if they need to cancel or reschedule, arrive to their appointments on time (preferrably 15 minutes early) (If you are late you will not be seen) and come to all scheduled appointments with the provider, mental health, and/or nurse navigator. If the patient is discharged from the practice, they can apply to be re-established after 6 months. Lab work:    Unless you are instructed differently, please return to the office between the hours of 7 am and 11:00 am Monday through Thursday to have your labs drawn one to two weeks before your next scheduled PROVIDER appointment. If you do not have an appointment to follow up on these results, please make one or plan to call the office if you do not hear from us to get the results. No news does not mean good news. Medications:   Medication  times are firm:  Mondays and Tuesdays from 1pm-5pm  Wednesdays and Thursdays from 8am-11:30am  These hours are subject to change without notice.     The [facility] or [facility] will assist you with your medications when available as not all medications can be obtained through this program. Medications are added and deleted from the [address] as deemed necessary by the pharmaceutical companies. There is a chance that a medication you currently receive from [facility] may be discontinued. If this occurs an alternative medication will be sent to a local pharmacy for you to obtain and pay for. If your medications are new or have changed, and you get your medications from the [facility]. [place] [address] [place] [address]), you MUST talk to the pharmacy staff to sign the new prescription applications. If you don't sign the applications we cannot get the medications for you. It usually takes 6-8 weeks for your medications to arrive. The Pharmacy staff will call you when your medications are available. If you don't hear from them you call [phone] and inquire about your medicines. You are responsible for obtaining your medications. You will have 30 days to come in and  your medications. If you don't  your medicines within those 30 days, those medicines may be placed on the self as samples and you will have to start all over again by completing the applications and waiting the 6-8 weeks for your medicines to arrive. Foot Care: Local ministry through [facility] ([address] [facility])  Every second Tuesday of the month (except for holidays and election days) from 9am to 1 pm. The services provided by these ministry volunteers are free of charge with the option to donate. They will inspect your feet thoroughly, soak them for 10 minutes, cut and file your nails. They care for diabetics as well. Keep in mind this service is free and will be on a first come first serve basis. Bad teeth? Ask about the Dental Clinic to get you in front of a local dentist when a dentist is available. They only extract teeth. No fillings, root canals, or dentures.  The bus leaves the second Thursday of the month for those on the list. (Ask about availability as these appointments are limited). If you are scheduled for the dentist and you fail to come into the Foundation to meet the bus, you WILL NOT be placed on the dental list again. IMPORTANT: if you have high blood pressure please take your blood pressure  medications before arriving to the Foundation. If your blood pressure is elevated  the dental clinic WILL NOT extract any teeth and you will not have another  opportunity to go to the clinic. DIABETES:   Do you have uncontrolled diabetes or you just want to learn more about your diabetes? Schedule with the Nurse navigator for our new 5-week Diabetes program. You will learn how to properly manage your diabetes: nutrition, exercise, medication therapy. Eye exams for Diabetics. Please let us know so we can add you to the list to see an eye doctor. These  appointments are limited. You will receive a free eye exam and free glasses if  needed. Unfortunately, if you are not a diabetic, we do not have a free service  for eye exams for you (yet!). We do have information on where to  go to get a  huge discount on eye exams and glasses. Sick visits: If you are sick and it is not an emergency call the office to schedule an appointment to see the provider. Care in the office is FREE but charges will be applied if you go to the Emergency room. If you feel better and do not wish to attend your sick appointment please call  the office and cancel to avoid a no-show. Charges and cost items from the Foundation:    Most of our orders are covered by [facility] [employer] but there ARE SOME CHARGES for items such as radiology interpretations and anesthesiology during procedures and surgeries that are not covered under [employer].       MedAssist   Please make sure you have contacted ffk environment to check on your payment options:   1 [phone] Mon - Fri 8-4pm. It is important that you are screened in order to qualify for assistance and to avoid huge medical charges. This service is to benefit you. The [facility] is not responsible for ANY charges you may accrue regardless of who ordered the medication, procedure, treatment or test. If you go to the Emergency Room, you WILL be charged! Behavior and emotional issues! It is stressful to be sick, have an illness, take medications, not have a job, not have medical insurance, have family issues or just getting older! Schedule an appointment with our mental health provider. She is in the office Mondays and Wednesdays from 8am to [address] [facility] can also contact the following: The national suicide hotline (4-066-325-EWRK or 8-261.501.8623)    [address], [address] [place] [address] [address] [place] [address]   [phone]    Drug and Alcohol Addiction Issues! It is hard to stop a poor habit but there is help out there. Please feel free to attend any of the following support groups to help you kick the habit or go to [facility] to be evaluated by the psychiatric team. Never give up!! Matthew meetings: [facility] MONDAY 10:30 AM [facility] [address] SATURDAY 8:00 PM [facility] SATURDAY NIGHT [address]       SOFIA BITS:  Disposing medicines in household trash: Almost all medicines can be thrown into your household trash. These include prescription and over-the-counter (OTC) drugs in pills, liquids, drops, patches, creams, and inhalers. Follow these steps:  1) Remove the drugs from their original containers and mix them with something undesirable, such as used coffee grounds, dirt, or cat litter.  This makes the medicine less appealing to children and pets and unrecognizable to someone who might intentionally go through the trash looking for drugs. 2) Put the mixture in something you can close (a re-sealable zipper storage bag, empty can, or other container) to prevent the drug from leaking or spilling out. 3) Throw the container in the garbage. 4) Scratch out all your personal information on the empty medicine packaging to protect your identity and privacy. Throw the packaging away.     If you have a question about your medicine, ask your health care provider or pharmacist.

## 2018-10-03 ENCOUNTER — TELEPHONE (OUTPATIENT)
Dept: FAMILY MEDICINE CLINIC | Age: 53
End: 2018-10-03

## 2018-10-03 NOTE — TELEPHONE ENCOUNTER
Medication: Kombiglyze 2.5/1000, dose: 1 tab, how often: qd , current number of medication days provided: 90, refill per application. Lot #: [id number], EXP 10/2019. This medication was received and verified for the following 1. Correct Patient, 2. Correct Diagnosis, 3. Correct Drug, 4. Correct route, and no current allergy to medication. Please contact patient to come  their medications.      Igor Malcolm MSN,RN,AGNP-C     [facility]

## 2018-10-11 ENCOUNTER — TELEPHONE (OUTPATIENT)
Dept: FAMILY MEDICINE CLINIC | Age: 53
End: 2018-10-11

## 2018-10-11 NOTE — TELEPHONE ENCOUNTER
Medication: Arthrotec 50/200mg , dose: 1 tab, how often: bid  as needed, current number of medication days provided: 90, refill per application. Lot #:[id number], EXP 03/2021. This medication was received and verified for the following 1. Correct Patient, 2. Correct Diagnosis, 3. Correct Drug, 4. Correct route, and no current allergy to medication. Please contact patient to come  their medications.      Vanessa Crawford, MSN,RN,[facility]